# Patient Record
Sex: MALE | Race: WHITE | URBAN - METROPOLITAN AREA
[De-identification: names, ages, dates, MRNs, and addresses within clinical notes are randomized per-mention and may not be internally consistent; named-entity substitution may affect disease eponyms.]

---

## 2017-01-27 ENCOUNTER — TRANSFERRED RECORDS (OUTPATIENT)
Dept: HEALTH INFORMATION MANAGEMENT | Facility: CLINIC | Age: 47
End: 2017-01-27

## 2017-02-01 ENCOUNTER — TRANSFERRED RECORDS (OUTPATIENT)
Dept: HEALTH INFORMATION MANAGEMENT | Facility: CLINIC | Age: 47
End: 2017-02-01

## 2017-02-13 ENCOUNTER — TRANSFERRED RECORDS (OUTPATIENT)
Dept: HEALTH INFORMATION MANAGEMENT | Facility: CLINIC | Age: 47
End: 2017-02-13

## 2017-02-15 ENCOUNTER — PRE VISIT (OUTPATIENT)
Dept: OTOLARYNGOLOGY | Facility: CLINIC | Age: 47
End: 2017-02-15

## 2017-02-15 NOTE — TELEPHONE ENCOUNTER
1.  Date/reason for appt:  2/22/17   Parapharyngeal Mass    2.  Referring provider:  Dr Jeromy Harris ENT    3.  Call to patient (Yes / No - short description):  No, referred    4.  Previous care at / records requested from:  [facility] and [facility]

## 2017-02-15 NOTE — TELEPHONE ENCOUNTER
Records received from [facility], will forward to clinic. Waiting for image.   Included:  ED note on 2/1/17, 1/27/17  CT neck on 2/1/17

## 2017-02-22 ENCOUNTER — OFFICE VISIT (OUTPATIENT)
Dept: OTOLARYNGOLOGY | Facility: CLINIC | Age: 47
End: 2017-02-22

## 2017-02-22 VITALS
HEIGHT: 70 IN | HEART RATE: 74 BPM | DIASTOLIC BLOOD PRESSURE: 90 MMHG | RESPIRATION RATE: 12 BRPM | SYSTOLIC BLOOD PRESSURE: 134 MMHG | BODY MASS INDEX: 26.2 KG/M2 | WEIGHT: 183 LBS | OXYGEN SATURATION: 95 %

## 2017-02-22 DIAGNOSIS — R22.1 PARAPHARYNGEAL SPACE MASS: Primary | ICD-10-CM

## 2017-02-22 RX ORDER — MULTIPLE VITAMINS W/ MINERALS TAB 9MG-400MCG
1 TAB ORAL DAILY
COMMUNITY

## 2017-02-22 ASSESSMENT — PAIN SCALES - GENERAL: PAINLEVEL: NO PAIN (0)

## 2017-02-22 NOTE — PROGRESS NOTES
Dear Dr. Pinzon:    I had the pleasure of meeting Josafat Elder in consultation today at the [facility] at your request.     History of Present Illness:   Mr Elder is a 46 year old man who is referred for evaluation of a parapharyngeal space mass. He has a 4-5 year history of progressive right sided facial swelling. He was seen in the local ER in January 2017 with right ear pain, decreased hearing and was found to have a right sided effusion. At that time he was noted to have right sided soft palate swelling and uvula deviation. He was planning on following up with his PCP. He was treated with a zpak with no improvement in his ear symptoms. He was again seen in the ER at the beginning of February with continued symptoms. A CT scan was obtained which showed a 6.2 cm parapharyngeal space mass. He was referred to Dr Pinzon who then referred him.    He says that he has a long standing history of popping of his right ear with intermittent muffled hearing, which he describes as being similar to the feeling with a change in altitude. He also feels some fullness in his throat but no specific dysphagia or odynophagia. He denies any voice changes or difficulty breathing. Family does endorse some snoring at night but no apnea. He feels his weight is stable. He denies any facial weakness or numbness.    He is otherwise healthy.    No family history of H&N cancers    MEDICATIONS:     Current Outpatient Prescriptions   Medication Sig Dispense Refill     multivitamin, therapeutic with minerals (MULTI-VITAMIN) TABS tablet Take 1 tablet by mouth daily         ALLERGIES:  No Known Allergies    HABITS/SOCIAL HISTORY:   Drinks alcohol 6 pack beer/day  Smoked since 1985, previously 4 ppd, now few cigarettes per day  Works as a , fishes in the winter    PAST MEDICAL HISTORY: No past medical history on file.     FAMILY HISTORY:  No family history on file.    REVIEW OF  "SYSTEMS:  12 point ROS was negative other than the symptoms noted above in the HPI.  Patient Supplied Answers to Review of Systems  No flowsheet data found.      PHYSICAL EXAMINATION:   /90  Pulse 74  Resp 12  Ht 1.778 m (5' 10\")  Wt 83 kg (183 lb)  SpO2 95%  BMI 26.26 kg/m2   Appearance:   normal; NAD, age-appropriate appearance, well-developed, normal habitus   Communication:   normal; communicates verbally, normal voice quality   Head/Face:   inspection -  RIght sided parotid fullness   Salivary glands -  Firm mass of the right cheek that displaces the parotid laterally    Facial strength -  Normal and symmetric bilateral; H/B I/VI   Skin:  normal, no rash   Ocular Motility:  normal occular movements   Ears:  auricle (AD) -  normal  EAC (AD) -  normal  TM (AD) -  Normal, no effusion  auricle (AS) -  normal  EAC (AS) -  normal  TM (AS) -  Normal, no effusion  Normal clinical speech reception   Nose:  Ext. inspection -  Normal  Internal Inspection -  Normal mucosa, septum, and turbinates   Nasopharynx:  normal mucosa  Fullness in right side of nasopharynx that partially obstructs torus   Oral Cavity:  lips -  Normal mucosa, oral competence, and stoma size   Age-appropriate dentition, healthy gingival mucosa   Hard palate, buccal, floor of mouth mucosa normal   Tongue - normal movement, no lesions   Oropharynx:  mucosa -  Normal, no lesions  soft palate -  Fullness and mass effect in right soft palate, firm to palpation; uvula deviated slightly to left from mass effect  Firm mass in right parapharyngeal space, full size cannot be appreciated  tonsils -  Normal, no exudates, no abnormal lesions, symmetric   Hypopharynx:  Normal pyriform sinus and pharyngeal wall mucosa   No pooled secretions   Mass effect along right lateral pharyngeal wall   Larynx:  Epiglottis, false vocal cord, true vocal cord normal in appearance, bilaterally mobile cords    Neck: No visible mass or asymmetry   Normal palpation, no " tenderness, no tracheal deviation  thyroid -  Normal   Normal range of motion   Lymphatic:  no abnormal nodes   Cardiovascular:  warm, pink, well-perfused extremities without swelling, tenderness, or edema   Respiratory:  Normal respiratory effort, no stridor   Neuro/Psych.:  mood/affect -  normal  mental status -  normal  cranial nerves -  normal          PROCEDURES:   Flexible fiberoptic laryngoscopy: Verbal consent was obtained. The nasal cavity was prepped with an aerosolized solution of topical anesthetic and vasoconstrictive agent. The scope was passed through the anterior nasal cavity and advanced. There was mass effect starting at the level of the right nasopharynx and extending along the right lateral pharyngeal wall. The torus on the right side is partially obstructed. Inspection of the larynx revealed bilaterally mobile vocal cords. Pyriform sinuses are symmetric. No intra-arytenoid irregularities noted. The epiglottis, aryepiglottic folds, vallecula and base of tongue are unremarkable. The airway is patent. Procedure tolerated well with no immediate complications noted.      RESULTS REVIEWED:   Outside records - summarized above    CT images were personally reviewed: large ~6 cm mass of the parapharyngeal space, medial to the mandible, extending from the skull base and into the neck, internal carotid is medial to the mass, no flow voids present within the mass    IMPRESSION AND PLAN:   Mr Elder is a 46 year old man with a large right parapharyngeal space mass. He does demonstrate some mass effect on his scope exam and I think he is experiencing some eustachian tube dysfunction from the mass. His snoring may also be from the mass effect of the tumor. We do not yet have a tissue diagnosis and I discussed with him that I would like to try to do this with image guidance. We will review the images at tumor board on Friday and determine the best way to obtain a tissue sample. Given the size of the mass,  there is certainly indication for surgical resection. I explained to the patient that I would ideally like to know if this is a malignancy prior to attempting resection. Pending our discussion at tumor board we may also obtain an MRI to assist with surgical planning. We briefly discussed surgery in that this would be a large resection, and we may need to perform a mandibulotomy for access given the size. I reviewed the CT images with the patient and demonstrated the close proximity of the internal carotid artery. He does start back to work soon, and I explained that he will likely need some time off from work following his stay in the hospital. I will plan on seeing him back in clinic for a full preoperative discussion pending our discussion at tumor board on Friday.    Thank you very much for the opportunity to participate in the care of your patient.      Sarita Wilson M.D.  Otolaryngology- Head & Neck Surgery          CC:  Joseph Pinzon MD, [facility], [facility] Medical Office [address], [address]  [place] [id number]

## 2017-02-22 NOTE — MR AVS SNAPSHOT
After Visit Summary   2017    Josafat Elder    MRN: [id number]           Patient Information     Date Of Birth          1970        Visit Information        Provider Department      2017 10:20 AM Sarita Wilson MD  [facility] and Th[facility]        Today's Diagnoses     Parapharyngeal space mass    -  1      Care Instructions    Your case will be presented at tumor board on Friday.  I will contact you with results and recommendations.  Call me if ?'s arise [phone]  Aleshia Osborn RN        Follow-ups after your visit        Who to contact     Please call your clinic at [phone] to:    Ask questions about your health    Make or cancel appointments    Discuss your medicines    Learn about your test results    Speak to your doctor   If you have compliments or concerns about an experience at your clinic, or if you wish to file a complaint, please contact [facility] Patient Relations at [phone] or email us at [email or web address]         Additional Information About Your Visit        MyChart Information     [employer] is an electronic gateway that provides easy, online access to your medical records. With [employer], you can request a clinic appointment, read your test results, renew a prescription or communicate with your care team.     To sign up for [employer] visit the website at [email or web address]   You will be asked to enter the access code listed below, as well as some personal information. Please follow the directions to create your username and password.     Your access code is: [email or web address]  Expires: 5/15/2017  2:10 PM     Your access code will  in 90 days. If you need help or a new code, please contact your [facility] or call [phone] for assistance.        Care EveryWhere ID     This is your Care EveryWhere ID. This could be used by other organizations to access your [facility]  "records  [id number]        Your Vitals Were     Pulse Respirations Height Pulse Oximetry BMI (Body Mass Index)       74 12 1.778 m (5' 10\") 95% 26.26 kg/m2        Blood Pressure from Last 3 Encounters:   02/22/17 134/90    Weight from Last 3 Encounters:   02/22/17 83 kg (183 lb)              Today, you had the following     No orders found for display       Primary Care Provider    None Specified       No primary provider on file.        Thank you!     Thank you for choosing [facility]  for your care. Our goal is always to provide you with excellent care. Hearing back from our patients is one way we can continue to improve our services. Please take a few minutes to complete the written survey that you may receive in the mail after your visit with us. Thank you!             Your Updated Medication List - Protect others around you: Learn how to safely use, store and throw away your medicines at www.disposemymeds.org.          This list is accurate as of: 2/22/17  6:47 PM.  Always use your most recent med list.                   Brand Name Dispense Instructions for use    Multi-vitamin Tabs tablet      Take 1 tablet by mouth daily         "

## 2017-02-22 NOTE — PATIENT INSTRUCTIONS
Your case will be presented at tumor board on Friday.  I will contact you with results and recommendations.  Call me if ?'s arise [phone]  Aleshia Osborn RN

## 2017-02-22 NOTE — LETTER
2/22/2017       RE: Josafat Elder  [address]  [address]  formerly [facility] [address]     Dear Colleague,    Thank you for referring your patient, Josafat Elder, to the [facility] at [facility]. Please see a copy of my visit note below.    Dear Dr. Pinzon:    I had the pleasure of meeting Josafat Elder in consultation today at the [facility] at your request.     History of Present Illness:   Mr Elder is a 46 year old man who is referred for evaluation of a parapharyngeal space mass. He has a 4-5 year history of progressive right sided facial swelling. He was seen in the local ER in January 2017 with right ear pain, decreased hearing and was found to have a right sided effusion. At that time he was noted to have right sided soft palate swelling and uvula deviation. He was planning on following up with his PCP. He was treated with a zpak with no improvement in his ear symptoms. He was again seen in the ER at the beginning of February with continued symptoms. A CT scan was obtained which showed a 6.2 cm parapharyngeal space mass. He was referred to Dr Pinzon who then referred him.    He says that he has a long standing history of popping of his right ear with intermittent muffled hearing, which he describes as being similar to the feeling with a change in altitude. He also feels some fullness in his throat but no specific dysphagia or odynophagia. He denies any voice changes or difficulty breathing. Family does endorse some snoring at night but no apnea. He feels his weight is stable. He denies any facial weakness or numbness.    He is otherwise healthy.    No family history of H&N cancers    MEDICATIONS:     Current Outpatient Prescriptions   Medication Sig Dispense Refill     multivitamin, therapeutic with minerals (MULTI-VITAMIN) TABS tablet Take 1 tablet by mouth daily         ALLERGIES:  No Known  "Allergies    HABITS/SOCIAL HISTORY:   Drinks alcohol 6 pack beer/day  Smoked since 1985, previously 4 ppd, now few cigarettes per day  Works as a , fishes in the winter    PAST MEDICAL HISTORY: No past medical history on file.     FAMILY HISTORY:  No family history on file.    REVIEW OF SYSTEMS:  12 point ROS was negative other than the symptoms noted above in the HPI.  Patient Supplied Answers to Review of Systems  No flowsheet data found.      PHYSICAL EXAMINATION:   /90  Pulse 74  Resp 12  Ht 1.778 m (5' 10\")  Wt 83 kg (183 lb)  SpO2 95%  BMI 26.26 kg/m2   Appearance:   normal; NAD, age-appropriate appearance, well-developed, normal habitus   Communication:   normal; communicates verbally, normal voice quality   Head/Face:   inspection -  RIght sided parotid fullness   Salivary glands -  Firm mass of the right cheek that displaces the parotid laterally    Facial strength -  Normal and symmetric bilateral; H/B I/VI   Skin:  normal, no rash   Ocular Motility:  normal occular movements   Ears:  auricle (AD) -  normal  EAC (AD) -  normal  TM (AD) -  Normal, no effusion  auricle (AS) -  normal  EAC (AS) -  normal  TM (AS) -  Normal, no effusion  Normal clinical speech reception   Nose:  Ext. inspection -  Normal  Internal Inspection -  Normal mucosa, septum, and turbinates   Nasopharynx:  normal mucosa  Fullness in right side of nasopharynx that partially obstructs torus   Oral Cavity:  lips -  Normal mucosa, oral competence, and stoma size   Age-appropriate dentition, healthy gingival mucosa   Hard palate, buccal, floor of mouth mucosa normal   Tongue - normal movement, no lesions   Oropharynx:  mucosa -  Normal, no lesions  soft palate -  Fullness and mass effect in right soft palate, firm to palpation; uvula deviated slightly to left from mass effect  Firm mass in right parapharyngeal space, full size cannot be appreciated  tonsils -  Normal, no exudates, no abnormal lesions, symmetric "   Hypopharynx:  Normal pyriform sinus and pharyngeal wall mucosa   No pooled secretions   Mass effect along right lateral pharyngeal wall   Larynx:  Epiglottis, false vocal cord, true vocal cord normal in appearance, bilaterally mobile cords    Neck: No visible mass or asymmetry   Normal palpation, no tenderness, no tracheal deviation  thyroid -  Normal   Normal range of motion   Lymphatic:  no abnormal nodes   Cardiovascular:  warm, pink, well-perfused extremities without swelling, tenderness, or edema   Respiratory:  Normal respiratory effort, no stridor   Neuro/Psych.:  mood/affect -  normal  mental status -  normal  cranial nerves -  normal          PROCEDURES:   Flexible fiberoptic laryngoscopy: Verbal consent was obtained. The nasal cavity was prepped with an aerosolized solution of topical anesthetic and vasoconstrictive agent. The scope was passed through the anterior nasal cavity and advanced. There was mass effect starting at the level of the right nasopharynx and extending along the right lateral pharyngeal wall. The torus on the right side is partially obstructed. Inspection of the larynx revealed bilaterally mobile vocal cords. Pyriform sinuses are symmetric. No intra-arytenoid irregularities noted. The epiglottis, aryepiglottic folds, vallecula and base of tongue are unremarkable. The airway is patent. Procedure tolerated well with no immediate complications noted.      RESULTS REVIEWED:   Outside records - summarized above    CT images were personally reviewed: large ~6 cm mass of the parapharyngeal space, medial to the mandible, extending from the skull base and into the neck, internal carotid is medial to the mass, no flow voids present within the mass    IMPRESSION AND PLAN:   Mr Elder is a 46 year old man with a large right parapharyngeal space mass. He does demonstrate some mass effect on his scope exam and I think he is experiencing some eustachian tube dysfunction from the mass. His snoring  may also be from the mass effect of the tumor. We do not yet have a tissue diagnosis and I discussed with him that I would like to try to do this with image guidance. We will review the images at tumor board on Friday and determine the best way to obtain a tissue sample. Given the size of the mass, there is certainly indication for surgical resection. I explained to the patient that I would ideally like to know if this is a malignancy prior to attempting resection. Pending our discussion at tumor board we may also obtain an MRI to assist with surgical planning. We briefly discussed surgery in that this would be a large resection, and we may need to perform a mandibulotomy for access given the size. I reviewed the CT images with the patient and demonstrated the close proximity of the internal carotid artery. He does start back to work soon, and I explained that he will likely need some time off from work following his stay in the hospital. I will plan on seeing him back in clinic for a full preoperative discussion pending our discussion at tumor board on Friday.    Thank you very much for the opportunity to participate in the care of your patient.      Sarita Wilson M.D.  Otolaryngology- Head & Neck Surgery          CC:  Joseph Pinzon MD, [facility], [facility] Office [address], [address]  [place]  [address]

## 2017-02-23 NOTE — TELEPHONE ENCOUNTER
Head & Neck Tumor Conference Note  02/23/2017    Status: New  Staff: Dr. Wilson    Tumor Site: Right parapharyngeal space mass  Tumor Stage: N/A    Brief History:  Mr. Josafat Elder is a 46 year old male presented with right facial swelling, right ear effusion, right throat fullness, found to have a 6.2 cm right parapharyngeal space mass.    Reason for Review:   Review image, discuss plan of care.    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 Recommend MRI for further characterization, also consider image guided biopsy.      Farrukh was called and notified of tumor board recommendations  He will be scheduled for MRI early next week in [place].  His surgery is scheduled for 3/16/17.

## 2017-02-24 ENCOUNTER — TEAM CONFERENCE (OUTPATIENT)
Dept: OTOLARYNGOLOGY | Facility: CLINIC | Age: 47
End: 2017-02-24
Attending: OTOLARYNGOLOGY

## 2017-02-24 DIAGNOSIS — R22.1 PARAPHARYNGEAL SPACE MASS: Primary | ICD-10-CM

## 2017-03-02 ENCOUNTER — HOSPITAL ENCOUNTER (INPATIENT)
Dept: GENERAL RADIOLOGY | Facility: CLINIC | Age: 47
End: 2017-03-02
Attending: OTOLARYNGOLOGY

## 2017-03-02 DIAGNOSIS — R22.1 PARAPHARYNGEAL SPACE MASS: Primary | ICD-10-CM

## 2017-03-03 ENCOUNTER — CARE COORDINATION (OUTPATIENT)
Dept: OTOLARYNGOLOGY | Facility: CLINIC | Age: 47
End: 2017-03-03

## 2017-03-03 NOTE — PROGRESS NOTES
Dr. Wilson reviewed Farrukh's MRI.  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  Farrukh will proceed with surgery

## 2017-03-15 ENCOUNTER — ANESTHESIA EVENT (OUTPATIENT)
Dept: SURGERY | Facility: CLINIC | Age: 47
DRG: 130 | End: 2017-03-15
Payer: COMMERCIAL

## 2017-03-16 ENCOUNTER — ANESTHESIA (OUTPATIENT)
Dept: SURGERY | Facility: CLINIC | Age: 47
DRG: 130 | End: 2017-03-16
Payer: COMMERCIAL

## 2017-03-16 ENCOUNTER — HOSPITAL ENCOUNTER (INPATIENT)
Facility: CLINIC | Age: 47
LOS: 3 days | Discharge: HOME | DRG: 130 | End: 2017-03-19
Attending: OTOLARYNGOLOGY | Admitting: OTOLARYNGOLOGY
Payer: COMMERCIAL

## 2017-03-16 DIAGNOSIS — G51.0 FACIAL NERVE PARESIS: ICD-10-CM

## 2017-03-16 DIAGNOSIS — R22.1 PARAPHARYNGEAL SPACE MASS: ICD-10-CM

## 2017-03-16 DIAGNOSIS — G89.18 ACUTE POST-OPERATIVE PAIN: Primary | ICD-10-CM

## 2017-03-16 DIAGNOSIS — K59.03 DRUG INDUCED CONSTIPATION: ICD-10-CM

## 2017-03-16 LAB
ABO + RH BLD: NORMAL
ABO + RH BLD: NORMAL
BASE EXCESS BLDA CALC-SCNC: 0.1 MMOL/L
BLD GP AB SCN SERPL QL: NORMAL
BLD PROD TYP BPU: NORMAL
BLOOD BANK CMNT PATIENT-IMP: NORMAL
CA-I BLD-MCNC: 4.8 MG/DL (ref 4.4–5.2)
CREAT SERPL-MCNC: 0.94 MG/DL (ref 0.66–1.25)
GFR SERPL CREATININE-BSD FRML MDRD: 86 ML/MIN/1.7M2
GLUCOSE BLD-MCNC: 142 MG/DL (ref 70–99)
HCO3 BLD-SCNC: 25 MMOL/L (ref 21–28)
HGB BLD-MCNC: 14.2 G/DL (ref 13.3–17.7)
HGB BLD-MCNC: 15.1 G/DL (ref 13.3–17.7)
NUM BPU REQUESTED: 2
O2/TOTAL GAS SETTING VFR VENT: 38 %
PCO2 BLD: 42 MM HG (ref 35–45)
PH BLD: 7.39 PH (ref 7.35–7.45)
PLATELET # BLD AUTO: 156 10E9/L (ref 150–450)
PO2 BLD: 150 MM HG (ref 80–105)
POTASSIUM BLD-SCNC: 4.6 MMOL/L (ref 3.4–5.3)
SODIUM BLD-SCNC: 139 MMOL/L (ref 133–144)
SPECIMEN EXP DATE BLD: NORMAL

## 2017-03-16 PROCEDURE — 0CTJ0ZZ RESECTION OF MINOR SALIVARY GLAND, OPEN APPROACH: ICD-10-PCS | Performed by: OTOLARYNGOLOGY

## 2017-03-16 PROCEDURE — 82803 BLOOD GASES ANY COMBINATION: CPT | Performed by: ANESTHESIOLOGY

## 2017-03-16 PROCEDURE — 25000125 ZZHC RX 250: Performed by: ANESTHESIOLOGY

## 2017-03-16 PROCEDURE — 85049 AUTOMATED PLATELET COUNT: CPT | Performed by: OTOLARYNGOLOGY

## 2017-03-16 PROCEDURE — 25000125 ZZHC RX 250: Performed by: NURSE ANESTHETIST, CERTIFIED REGISTERED

## 2017-03-16 PROCEDURE — 82947 ASSAY GLUCOSE BLOOD QUANT: CPT | Performed by: ANESTHESIOLOGY

## 2017-03-16 PROCEDURE — 37000008 ZZH ANESTHESIA TECHNICAL FEE, 1ST 30 MIN: Performed by: OTOLARYNGOLOGY

## 2017-03-16 PROCEDURE — 25000128 H RX IP 250 OP 636: Performed by: ANESTHESIOLOGY

## 2017-03-16 PROCEDURE — 25800025 ZZH RX 258: Performed by: OTOLARYNGOLOGY

## 2017-03-16 PROCEDURE — 07T10ZZ RESECTION OF RIGHT NECK LYMPHATIC, OPEN APPROACH: ICD-10-PCS | Performed by: OTOLARYNGOLOGY

## 2017-03-16 PROCEDURE — 82565 ASSAY OF CREATININE: CPT | Performed by: OTOLARYNGOLOGY

## 2017-03-16 PROCEDURE — 82330 ASSAY OF CALCIUM: CPT | Performed by: ANESTHESIOLOGY

## 2017-03-16 PROCEDURE — 84132 ASSAY OF SERUM POTASSIUM: CPT | Performed by: ANESTHESIOLOGY

## 2017-03-16 PROCEDURE — 40000275 ZZH STATISTIC RCP TIME EA 10 MIN

## 2017-03-16 PROCEDURE — 88305 TISSUE EXAM BY PATHOLOGIST: CPT | Performed by: OTOLARYNGOLOGY

## 2017-03-16 PROCEDURE — 86901 BLOOD TYPING SEROLOGIC RH(D): CPT | Performed by: OTOLARYNGOLOGY

## 2017-03-16 PROCEDURE — 25000128 H RX IP 250 OP 636: Performed by: OTOLARYNGOLOGY

## 2017-03-16 PROCEDURE — 25000128 H RX IP 250 OP 636

## 2017-03-16 PROCEDURE — 86900 BLOOD TYPING SEROLOGIC ABO: CPT | Performed by: OTOLARYNGOLOGY

## 2017-03-16 PROCEDURE — 40000014 ZZH STATISTIC ARTERIAL MONITORING DAILY

## 2017-03-16 PROCEDURE — 12000008 ZZH R&B INTERMEDIATE UMMC

## 2017-03-16 PROCEDURE — 25000128 H RX IP 250 OP 636: Performed by: NURSE ANESTHETIST, CERTIFIED REGISTERED

## 2017-03-16 PROCEDURE — 36000064 ZZH SURGERY LEVEL 4 EA 15 ADDTL MIN - UMMC: Performed by: OTOLARYNGOLOGY

## 2017-03-16 PROCEDURE — 86850 RBC ANTIBODY SCREEN: CPT | Performed by: OTOLARYNGOLOGY

## 2017-03-16 PROCEDURE — 0CJS8ZZ INSPECTION OF LARYNX, VIA NATURAL OR ARTIFICIAL OPENING ENDOSCOPIC: ICD-10-PCS | Performed by: OTOLARYNGOLOGY

## 2017-03-16 PROCEDURE — 71000014 ZZH RECOVERY PHASE 1 LEVEL 2 FIRST HR: Performed by: OTOLARYNGOLOGY

## 2017-03-16 PROCEDURE — 36415 COLL VENOUS BLD VENIPUNCTURE: CPT | Performed by: OTOLARYNGOLOGY

## 2017-03-16 PROCEDURE — 0CBM0ZZ EXCISION OF PHARYNX, OPEN APPROACH: ICD-10-PCS | Performed by: OTOLARYNGOLOGY

## 2017-03-16 PROCEDURE — 25000565 ZZH ISOFLURANE, EA 15 MIN: Performed by: OTOLARYNGOLOGY

## 2017-03-16 PROCEDURE — 27210995 ZZH RX 272: Performed by: OTOLARYNGOLOGY

## 2017-03-16 PROCEDURE — 37000009 ZZH ANESTHESIA TECHNICAL FEE, EACH ADDTL 15 MIN: Performed by: OTOLARYNGOLOGY

## 2017-03-16 PROCEDURE — 86923 COMPATIBILITY TEST ELECTRIC: CPT | Performed by: OTOLARYNGOLOGY

## 2017-03-16 PROCEDURE — 88307 TISSUE EXAM BY PATHOLOGIST: CPT | Performed by: OTOLARYNGOLOGY

## 2017-03-16 PROCEDURE — 25000132 ZZH RX MED GY IP 250 OP 250 PS 637: Performed by: OTOLARYNGOLOGY

## 2017-03-16 PROCEDURE — 84295 ASSAY OF SERUM SODIUM: CPT | Performed by: ANESTHESIOLOGY

## 2017-03-16 PROCEDURE — 85018 HEMOGLOBIN: CPT | Performed by: ANESTHESIOLOGY

## 2017-03-16 PROCEDURE — 25800025 ZZH RX 258: Performed by: NURSE ANESTHETIST, CERTIFIED REGISTERED

## 2017-03-16 PROCEDURE — 36000062 ZZH SURGERY LEVEL 4 1ST 30 MIN - UMMC: Performed by: OTOLARYNGOLOGY

## 2017-03-16 PROCEDURE — 40000170 ZZH STATISTIC PRE-PROCEDURE ASSESSMENT II: Performed by: OTOLARYNGOLOGY

## 2017-03-16 PROCEDURE — 00BM0ZZ EXCISION OF FACIAL NERVE, OPEN APPROACH: ICD-10-PCS | Performed by: OTOLARYNGOLOGY

## 2017-03-16 PROCEDURE — 0CT80ZZ RESECTION OF RIGHT PAROTID GLAND, OPEN APPROACH: ICD-10-PCS | Performed by: OTOLARYNGOLOGY

## 2017-03-16 PROCEDURE — 27210794 ZZH OR GENERAL SUPPLY STERILE: Performed by: OTOLARYNGOLOGY

## 2017-03-16 RX ORDER — MULTIPLE VITAMINS W/ MINERALS TAB 9MG-400MCG
1 TAB ORAL DAILY
Status: DISCONTINUED | OUTPATIENT
Start: 2017-03-17 | End: 2017-03-19 | Stop reason: HOSPADM

## 2017-03-16 RX ORDER — LANOLIN ALCOHOL/MO/W.PET/CERES
100 CREAM (GRAM) TOPICAL DAILY
Status: COMPLETED | OUTPATIENT
Start: 2017-03-17 | End: 2017-03-19

## 2017-03-16 RX ORDER — DIPHENHYDRAMINE HCL 25 MG
25 CAPSULE ORAL EVERY 6 HOURS PRN
Status: DISCONTINUED | OUTPATIENT
Start: 2017-03-16 | End: 2017-03-19 | Stop reason: HOSPADM

## 2017-03-16 RX ORDER — PROCHLORPERAZINE MALEATE 5 MG
5-10 TABLET ORAL EVERY 6 HOURS PRN
Status: DISCONTINUED | OUTPATIENT
Start: 2017-03-16 | End: 2017-03-19 | Stop reason: HOSPADM

## 2017-03-16 RX ORDER — FOLIC ACID 1 MG/1
1 TABLET ORAL DAILY
Status: DISCONTINUED | OUTPATIENT
Start: 2017-03-17 | End: 2017-03-19 | Stop reason: HOSPADM

## 2017-03-16 RX ORDER — AMOXICILLIN 250 MG
1-2 CAPSULE ORAL 2 TIMES DAILY
Status: DISCONTINUED | OUTPATIENT
Start: 2017-03-16 | End: 2017-03-19 | Stop reason: HOSPADM

## 2017-03-16 RX ORDER — FENTANYL CITRATE 50 UG/ML
INJECTION, SOLUTION INTRAMUSCULAR; INTRAVENOUS PRN
Status: DISCONTINUED | OUTPATIENT
Start: 2017-03-16 | End: 2017-03-16

## 2017-03-16 RX ORDER — LIDOCAINE 40 MG/G
CREAM TOPICAL
Status: DISCONTINUED | OUTPATIENT
Start: 2017-03-16 | End: 2017-03-19 | Stop reason: HOSPADM

## 2017-03-16 RX ORDER — SODIUM CHLORIDE, SODIUM LACTATE, POTASSIUM CHLORIDE, CALCIUM CHLORIDE 600; 310; 30; 20 MG/100ML; MG/100ML; MG/100ML; MG/100ML
INJECTION, SOLUTION INTRAVENOUS CONTINUOUS
Status: DISCONTINUED | OUTPATIENT
Start: 2017-03-16 | End: 2017-03-16 | Stop reason: HOSPADM

## 2017-03-16 RX ORDER — OXYCODONE HYDROCHLORIDE 5 MG/1
5-10 TABLET ORAL
Status: DISCONTINUED | OUTPATIENT
Start: 2017-03-16 | End: 2017-03-19 | Stop reason: HOSPADM

## 2017-03-16 RX ORDER — ACETAMINOPHEN 325 MG/1
975 TABLET ORAL EVERY 8 HOURS
Status: DISCONTINUED | OUTPATIENT
Start: 2017-03-16 | End: 2017-03-19 | Stop reason: HOSPADM

## 2017-03-16 RX ORDER — NALOXONE HYDROCHLORIDE 0.4 MG/ML
.1-.4 INJECTION, SOLUTION INTRAMUSCULAR; INTRAVENOUS; SUBCUTANEOUS
Status: DISCONTINUED | OUTPATIENT
Start: 2017-03-16 | End: 2017-03-16 | Stop reason: HOSPADM

## 2017-03-16 RX ORDER — ONDANSETRON 4 MG/1
4 TABLET, ORALLY DISINTEGRATING ORAL EVERY 30 MIN PRN
Status: DISCONTINUED | OUTPATIENT
Start: 2017-03-16 | End: 2017-03-16 | Stop reason: HOSPADM

## 2017-03-16 RX ORDER — MINERAL OIL/HYDROPHIL PETROLAT
OINTMENT (GRAM) TOPICAL EVERY 8 HOURS
Status: DISCONTINUED | OUTPATIENT
Start: 2017-03-17 | End: 2017-03-19 | Stop reason: HOSPADM

## 2017-03-16 RX ORDER — DIPHENHYDRAMINE HYDROCHLORIDE 50 MG/ML
25 INJECTION INTRAMUSCULAR; INTRAVENOUS EVERY 6 HOURS PRN
Status: DISCONTINUED | OUTPATIENT
Start: 2017-03-16 | End: 2017-03-19 | Stop reason: HOSPADM

## 2017-03-16 RX ORDER — ONDANSETRON 2 MG/ML
4 INJECTION INTRAMUSCULAR; INTRAVENOUS EVERY 30 MIN PRN
Status: DISCONTINUED | OUTPATIENT
Start: 2017-03-16 | End: 2017-03-16 | Stop reason: HOSPADM

## 2017-03-16 RX ORDER — ONDANSETRON 4 MG/1
4 TABLET, ORALLY DISINTEGRATING ORAL EVERY 6 HOURS PRN
Status: DISCONTINUED | OUTPATIENT
Start: 2017-03-16 | End: 2017-03-19 | Stop reason: HOSPADM

## 2017-03-16 RX ORDER — PROPOFOL 10 MG/ML
INJECTION, EMULSION INTRAVENOUS PRN
Status: DISCONTINUED | OUTPATIENT
Start: 2017-03-16 | End: 2017-03-16

## 2017-03-16 RX ORDER — ONDANSETRON 2 MG/ML
INJECTION INTRAMUSCULAR; INTRAVENOUS PRN
Status: DISCONTINUED | OUTPATIENT
Start: 2017-03-16 | End: 2017-03-16

## 2017-03-16 RX ORDER — GINSENG 100 MG
CAPSULE ORAL EVERY 8 HOURS
Status: ACTIVE | OUTPATIENT
Start: 2017-03-16 | End: 2017-03-18

## 2017-03-16 RX ORDER — METOCLOPRAMIDE 10 MG/1
10 TABLET ORAL EVERY 6 HOURS PRN
Status: DISCONTINUED | OUTPATIENT
Start: 2017-03-16 | End: 2017-03-19 | Stop reason: HOSPADM

## 2017-03-16 RX ORDER — FENTANYL CITRATE 50 UG/ML
25-50 INJECTION, SOLUTION INTRAMUSCULAR; INTRAVENOUS
Status: DISCONTINUED | OUTPATIENT
Start: 2017-03-16 | End: 2017-03-16 | Stop reason: HOSPADM

## 2017-03-16 RX ORDER — LIDOCAINE HYDROCHLORIDE 20 MG/ML
INJECTION, SOLUTION INFILTRATION; PERINEURAL PRN
Status: DISCONTINUED | OUTPATIENT
Start: 2017-03-16 | End: 2017-03-16

## 2017-03-16 RX ORDER — HYDROMORPHONE HYDROCHLORIDE 1 MG/ML
.3-.5 INJECTION, SOLUTION INTRAMUSCULAR; INTRAVENOUS; SUBCUTANEOUS EVERY 5 MIN PRN
Status: DISCONTINUED | OUTPATIENT
Start: 2017-03-16 | End: 2017-03-16 | Stop reason: HOSPADM

## 2017-03-16 RX ORDER — LORAZEPAM 1 MG/1
1-4 TABLET ORAL EVERY 30 MIN PRN
Status: DISCONTINUED | OUTPATIENT
Start: 2017-03-16 | End: 2017-03-19 | Stop reason: HOSPADM

## 2017-03-16 RX ORDER — DEXAMETHASONE SODIUM PHOSPHATE 4 MG/ML
INJECTION, SOLUTION INTRA-ARTICULAR; INTRALESIONAL; INTRAMUSCULAR; INTRAVENOUS; SOFT TISSUE PRN
Status: DISCONTINUED | OUTPATIENT
Start: 2017-03-16 | End: 2017-03-16

## 2017-03-16 RX ORDER — SODIUM CHLORIDE, SODIUM LACTATE, POTASSIUM CHLORIDE, CALCIUM CHLORIDE 600; 310; 30; 20 MG/100ML; MG/100ML; MG/100ML; MG/100ML
INJECTION, SOLUTION INTRAVENOUS CONTINUOUS PRN
Status: DISCONTINUED | OUTPATIENT
Start: 2017-03-16 | End: 2017-03-16

## 2017-03-16 RX ORDER — ATENOLOL 50 MG/1
50 TABLET ORAL DAILY PRN
Status: DISCONTINUED | OUTPATIENT
Start: 2017-03-16 | End: 2017-03-19 | Stop reason: HOSPADM

## 2017-03-16 RX ORDER — LIDOCAINE 40 MG/G
CREAM TOPICAL
Status: DISCONTINUED | OUTPATIENT
Start: 2017-03-16 | End: 2017-03-16 | Stop reason: HOSPADM

## 2017-03-16 RX ORDER — NALOXONE HYDROCHLORIDE 0.4 MG/ML
.1-.4 INJECTION, SOLUTION INTRAMUSCULAR; INTRAVENOUS; SUBCUTANEOUS
Status: DISCONTINUED | OUTPATIENT
Start: 2017-03-16 | End: 2017-03-19 | Stop reason: HOSPADM

## 2017-03-16 RX ORDER — GLYCOPYRROLATE 0.2 MG/ML
INJECTION, SOLUTION INTRAMUSCULAR; INTRAVENOUS PRN
Status: DISCONTINUED | OUTPATIENT
Start: 2017-03-16 | End: 2017-03-16

## 2017-03-16 RX ORDER — MORPHINE SULFATE 2 MG/ML
2-4 INJECTION, SOLUTION INTRAMUSCULAR; INTRAVENOUS
Status: DISCONTINUED | OUTPATIENT
Start: 2017-03-16 | End: 2017-03-19 | Stop reason: HOSPADM

## 2017-03-16 RX ORDER — ACETAMINOPHEN 325 MG/1
650 TABLET ORAL EVERY 4 HOURS PRN
Status: DISCONTINUED | OUTPATIENT
Start: 2017-03-19 | End: 2017-03-19 | Stop reason: HOSPADM

## 2017-03-16 RX ORDER — ONDANSETRON 2 MG/ML
4 INJECTION INTRAMUSCULAR; INTRAVENOUS EVERY 6 HOURS PRN
Status: DISCONTINUED | OUTPATIENT
Start: 2017-03-16 | End: 2017-03-19 | Stop reason: HOSPADM

## 2017-03-16 RX ORDER — METOCLOPRAMIDE HYDROCHLORIDE 5 MG/ML
10 INJECTION INTRAMUSCULAR; INTRAVENOUS EVERY 6 HOURS PRN
Status: DISCONTINUED | OUTPATIENT
Start: 2017-03-16 | End: 2017-03-19 | Stop reason: HOSPADM

## 2017-03-16 RX ORDER — DEXTROSE MONOHYDRATE, SODIUM CHLORIDE, AND POTASSIUM CHLORIDE 50; 1.49; 4.5 G/1000ML; G/1000ML; G/1000ML
INJECTION, SOLUTION INTRAVENOUS CONTINUOUS
Status: DISCONTINUED | OUTPATIENT
Start: 2017-03-16 | End: 2017-03-17

## 2017-03-16 RX ORDER — CEFAZOLIN SODIUM 2 G/100ML
2 INJECTION, SOLUTION INTRAVENOUS
Status: COMPLETED | OUTPATIENT
Start: 2017-03-16 | End: 2017-03-16

## 2017-03-16 RX ORDER — CEFAZOLIN SODIUM 2 G/100ML
2 INJECTION, SOLUTION INTRAVENOUS EVERY 8 HOURS
Status: COMPLETED | OUTPATIENT
Start: 2017-03-17 | End: 2017-03-17

## 2017-03-16 RX ADMIN — MIDAZOLAM HYDROCHLORIDE 2 MG: 1 INJECTION, SOLUTION INTRAMUSCULAR; INTRAVENOUS at 07:27

## 2017-03-16 RX ADMIN — REMIFENTANIL HYDROCHLORIDE 0.05 MCG/KG/MIN: 1 INJECTION, POWDER, LYOPHILIZED, FOR SOLUTION INTRAVENOUS at 08:05

## 2017-03-16 RX ADMIN — HYDROMORPHONE HYDROCHLORIDE 0.4 MG: 1 INJECTION, SOLUTION INTRAMUSCULAR; INTRAVENOUS; SUBCUTANEOUS at 18:38

## 2017-03-16 RX ADMIN — FENTANYL CITRATE 25 MCG: 50 INJECTION, SOLUTION INTRAMUSCULAR; INTRAVENOUS at 20:19

## 2017-03-16 RX ADMIN — SODIUM CHLORIDE, POTASSIUM CHLORIDE, SODIUM LACTATE AND CALCIUM CHLORIDE: 600; 310; 30; 20 INJECTION, SOLUTION INTRAVENOUS at 08:24

## 2017-03-16 RX ADMIN — HYDROMORPHONE HYDROCHLORIDE 0.3 MG: 10 INJECTION, SOLUTION INTRAMUSCULAR; INTRAVENOUS; SUBCUTANEOUS at 20:26

## 2017-03-16 RX ADMIN — CEFAZOLIN SODIUM 1 G: 2 INJECTION, SOLUTION INTRAVENOUS at 12:15

## 2017-03-16 RX ADMIN — SODIUM CHLORIDE, POTASSIUM CHLORIDE, SODIUM LACTATE AND CALCIUM CHLORIDE: 600; 310; 30; 20 INJECTION, SOLUTION INTRAVENOUS at 07:27

## 2017-03-16 RX ADMIN — REMIFENTANIL HYDROCHLORIDE 0.1 MCG/KG/MIN: 1 INJECTION, POWDER, LYOPHILIZED, FOR SOLUTION INTRAVENOUS at 15:01

## 2017-03-16 RX ADMIN — FENTANYL CITRATE 25 MCG: 50 INJECTION, SOLUTION INTRAMUSCULAR; INTRAVENOUS at 20:27

## 2017-03-16 RX ADMIN — SODIUM CHLORIDE, POTASSIUM CHLORIDE, SODIUM LACTATE AND CALCIUM CHLORIDE: 600; 310; 30; 20 INJECTION, SOLUTION INTRAVENOUS at 13:00

## 2017-03-16 RX ADMIN — PROPOFOL 160 MG: 10 INJECTION, EMULSION INTRAVENOUS at 07:37

## 2017-03-16 RX ADMIN — LIDOCAINE HYDROCHLORIDE 100 MG: 20 INJECTION, SOLUTION INFILTRATION; PERINEURAL at 07:37

## 2017-03-16 RX ADMIN — SODIUM CHLORIDE, POTASSIUM CHLORIDE, SODIUM LACTATE AND CALCIUM CHLORIDE: 600; 310; 30; 20 INJECTION, SOLUTION INTRAVENOUS at 10:16

## 2017-03-16 RX ADMIN — POTASSIUM CHLORIDE, DEXTROSE MONOHYDRATE AND SODIUM CHLORIDE: 150; 5; 450 INJECTION, SOLUTION INTRAVENOUS at 20:05

## 2017-03-16 RX ADMIN — CEFAZOLIN SODIUM 1 G: 2 INJECTION, SOLUTION INTRAVENOUS at 18:15

## 2017-03-16 RX ADMIN — FENTANYL CITRATE 100 MCG: 50 INJECTION, SOLUTION INTRAMUSCULAR; INTRAVENOUS at 08:01

## 2017-03-16 RX ADMIN — SUCCINYLCHOLINE CHLORIDE 140 MG: 20 INJECTION, SOLUTION INTRAMUSCULAR; INTRAVENOUS at 07:38

## 2017-03-16 RX ADMIN — FENTANYL CITRATE 50 MCG: 50 INJECTION, SOLUTION INTRAMUSCULAR; INTRAVENOUS at 07:47

## 2017-03-16 RX ADMIN — CEFAZOLIN SODIUM 1 G: 2 INJECTION, SOLUTION INTRAVENOUS at 16:15

## 2017-03-16 RX ADMIN — FENTANYL CITRATE 25 MCG: 50 INJECTION, SOLUTION INTRAMUSCULAR; INTRAVENOUS at 20:04

## 2017-03-16 RX ADMIN — CEFAZOLIN SODIUM 1 G: 2 INJECTION, SOLUTION INTRAVENOUS at 10:15

## 2017-03-16 RX ADMIN — CEFAZOLIN SODIUM 2 G: 2 INJECTION, SOLUTION INTRAVENOUS at 08:15

## 2017-03-16 RX ADMIN — DEXAMETHASONE SODIUM PHOSPHATE 8 MG: 4 INJECTION, SOLUTION INTRAMUSCULAR; INTRAVENOUS at 08:10

## 2017-03-16 RX ADMIN — GLYCOPYRROLATE 0.2 MG: 0.2 INJECTION, SOLUTION INTRAMUSCULAR; INTRAVENOUS at 07:34

## 2017-03-16 RX ADMIN — PROPOFOL 50 MG: 10 INJECTION, EMULSION INTRAVENOUS at 19:09

## 2017-03-16 RX ADMIN — PROPOFOL 40 MG: 10 INJECTION, EMULSION INTRAVENOUS at 07:47

## 2017-03-16 RX ADMIN — OXYCODONE HYDROCHLORIDE 10 MG: 5 TABLET ORAL at 22:20

## 2017-03-16 RX ADMIN — DEXAMETHASONE SODIUM PHOSPHATE 10 MG: 4 INJECTION, SOLUTION INTRAMUSCULAR; INTRAVENOUS at 16:10

## 2017-03-16 RX ADMIN — ONDANSETRON 4 MG: 2 INJECTION INTRAMUSCULAR; INTRAVENOUS at 18:40

## 2017-03-16 RX ADMIN — SODIUM CHLORIDE, POTASSIUM CHLORIDE, SODIUM LACTATE AND CALCIUM CHLORIDE: 600; 310; 30; 20 INJECTION, SOLUTION INTRAVENOUS at 14:28

## 2017-03-16 RX ADMIN — CEFAZOLIN SODIUM 1 G: 2 INJECTION, SOLUTION INTRAVENOUS at 14:15

## 2017-03-16 RX ADMIN — FENTANYL CITRATE 100 MCG: 50 INJECTION, SOLUTION INTRAMUSCULAR; INTRAVENOUS at 07:37

## 2017-03-16 RX ADMIN — FENTANYL CITRATE 25 MCG: 50 INJECTION, SOLUTION INTRAMUSCULAR; INTRAVENOUS at 20:14

## 2017-03-16 RX ADMIN — HYDROMORPHONE HYDROCHLORIDE 0.4 MG: 1 INJECTION, SOLUTION INTRAMUSCULAR; INTRAVENOUS; SUBCUTANEOUS at 19:09

## 2017-03-16 ASSESSMENT — VISUAL ACUITY: OU: GLASSES

## 2017-03-16 ASSESSMENT — LIFESTYLE VARIABLES: TOBACCO_USE: 1

## 2017-03-16 NOTE — IP AVS SNAPSHOT
MRN:[id number]                      After Visit Summary   3/16/2017    Josafat Elder    MRN: [id number]           Thank you!     Thank you for choosing [facility] for your care. Our goal is always to provide you with excellent care. Hearing back from our patients is one way we can continue to improve our services. Please take a few minutes to complete the written survey that you may receive in the mail after you visit with us. Thank you!        Patient Information     Date Of Birth          1970        About your hospital stay     You were admitted on:  March 16, 2017 You last received care in the:  [address] [facility]    You were discharged on:  March 19, 2017        Reason for your hospital stay       Neck surgery                  Who to Call     For medical emergencies, please call 911.  For non-urgent questions about your medical care, please call your primary care provider or clinic, [phone]  For questions related to your surgery, please call your surgery clinic        Attending Provider     Provider Sarita Thurman MD Otolaryngology       Primary Care Provider Office Phone # Fax #    John Ashraf [phone]       [facility] [address] [facility] [address] [address]        After Care Instructions     Activity       Your activity upon discharge: activity as tolerated            Diet       Follow this diet upon discharge: Regular            Wound care and dressings       Instructions to care for your wound at home: apply Aquaphor to wound three times a day.                  Follow-up Appointments     Adult [facility] Follow-up and recommended labs and tests       Follow up with Dr. Wilson , at ENT clinic on 3/24/17  Appointments on [place] and/or [facility] (with [facility] or [facility] provider or service). Call [phone] if you haven't heard regarding these appointments within 7 days of discharge.                  Your next 10 appointments  "already scheduled     Mar 24, 2017  2:20 PM CDT   (Arrive by 2:05 PM)   RETURN TUMOR VISIT with Sarita Wilson MD   [facility] ([facility])    [address] [place] [id number] [phone]              Pending Results     Date and Time Order Name Status Description    3/16/2017 1236 Surgical pathology exam In process             Statement of Approval     Ordered          17 1430  I have reviewed and agree with all the recommendations and orders detailed in this document.  EFFECTIVE NOW     Approved and electronically signed by:  Germán Olguin             Admission Information     Date & Time Provider Department Dept. Phone    3/16/2017 Sarita Wilson MD [address] [facility] [phone]      Your Vitals Were     Blood Pressure Pulse Temperature Respirations Height Weight    155/106 (BP Location: Right arm) 93 98.4  F (36.9  C) (Oral) 16 1.778 m (5' 10\") 83 kg (182 lb 15.7 oz)    Pulse Oximetry BMI (Body Mass Index)                99% 26.26 kg/m2          [employer] Information     [employer] lets you send messages to your doctor, view your test results, renew your prescriptions, schedule appointments and more. To sign up, go to [email or web address] . Click on \"Log in\" on the left side of the screen, which will take you to the Welcome page. Then click on \"Sign up Now\" on the right side of the page.     You will be asked to enter the access code listed below, as well as some personal information. Please follow the directions to create your username and password.     Your access code is: [email or web address]  Expires: 5/15/2017  3:10 PM     Your access code will  in 90 days. If you need help or a new code, please call your [facility] or [phone].        Care EveryWhere ID     This is your Care EveryWhere ID. This could be used by other organizations to access your [facility] medical records  [id number]           Review of your " medicines      START taking        Dose / Directions    carboxymethylcellulose 1 % ophthalmic solution   Commonly known as:  CELLUVISC/REFRESH LIQUIGEL   Used for:  Facial nerve paresis        Dose:  2 drop   Place 2 drops into the right eye every 2 hours as needed for dry eyes   Quantity:  1 Bottle   Refills:  0       mineral oil-hydrophilic petrolatum   Used for:  Parapharyngeal space mass        Dose:  1 g   Apply 1 g topically every 8 hours To neck incision   Quantity:  50 g   Refills:  0       oxyCODONE 5 MG IR tablet   Commonly known as:  ROXICODONE   Used for:  Acute post-operative pain        Dose:  5-10 mg   Take 1-2 tablets (5-10 mg) by mouth every 3 hours as needed for moderate to severe pain   Quantity:  40 tablet   Refills:  0       senna-docusate 8.6-50 MG per tablet   Commonly known as:  SENOKOT-S;PERICOLACE   Used for:  Drug induced constipation        Dose:  1-2 tablet   Take 1-2 tablets by mouth 2 times daily   Quantity:  20 tablet   Refills:  0         CONTINUE these medicines which have NOT CHANGED        Dose / Directions    Multi-vitamin Tabs tablet        Dose:  1 tablet   Take 1 tablet by mouth daily   Refills:  0            Where to get your medicines      These medications were sent to [facility] [place], [place] - [address] [address]     Phone:  [phone]     carboxymethylcellulose 1 % ophthalmic solution    mineral oil-hydrophilic petrolatum    senna-docusate 8.6-50 MG per tablet         Some of these will need a paper prescription and others can be bought over the counter. Ask your nurse if you have questions.     Bring a paper prescription for each of these medications     oxyCODONE 5 MG IR tablet                Protect others around you: Learn how to safely use, store and throw away your medicines at www.disposemymeds.org.             Medication List: This is a list of all your medications and when to take them. Check  marks below indicate your daily home schedule. Keep this list as a reference.      Medications           Morning Afternoon Evening Bedtime As Needed    carboxymethylcellulose 1 % ophthalmic solution   Commonly known as:  CELLUVISC/REFRESH LIQUIGEL   Place 2 drops into the right eye every 2 hours as needed for dry eyes   Last time this was given:  2 drops on 3/17/2017 10:45 AM                                mineral oil-hydrophilic petrolatum   Apply 1 g topically every 8 hours To neck incision   Last time this was given:  3/19/2017  1:58 PM                                Multi-vitamin Tabs tablet   Take 1 tablet by mouth daily   Last time this was given:  1 tablet on 3/19/2017  8:00 AM                                oxyCODONE 5 MG IR tablet   Commonly known as:  ROXICODONE   Take 1-2 tablets (5-10 mg) by mouth every 3 hours as needed for moderate to severe pain   Last time this was given:  10 mg on 3/19/2017  1:58 PM                                senna-docusate 8.6-50 MG per tablet   Commonly known as:  SENOKOT-S;PERICOLACE   Take 1-2 tablets by mouth 2 times daily   Last time this was given:  2 tablets on 3/19/2017  8:00 AM

## 2017-03-16 NOTE — ANESTHESIA PREPROCEDURE EVALUATION
Anesthesia Evaluation     . Pt has had prior anesthetic. Type: MAC    No history of anesthetic complications     ROS/MED HX    ENT/Pulmonary:     (+)[facility] risk factors snores loudly, tobacco use, Past use , . .    Neurologic:       Cardiovascular:     (+) ----. : . . . :. . No previous cardiac testing       METS/Exercise Tolerance:     Hematologic:         Musculoskeletal:         GI/Hepatic:         Renal/Genitourinary:         Endo:         Psychiatric:         Infectious Disease:         Malignancy:         Other:               Physical Exam  Normal systems: cardiovascular, pulmonary and dental    Airway   Mallampati: I  TM distance: >3 FB  Neck ROM: full  Comment: Mass from right parapharyngeal space pushing to midline.  Patent on left    Dental     Cardiovascular       Pulmonary                     Anesthesia Plan      History & Physical Review  History and physical reviewed and following examination; no interval change.    ASA Status:  3 .    NPO Status:  > 8 hours    Plan for General and ETT with Intravenous induction. Maintenance will be Balanced.    PONV prophylaxis:  Ondansetron (or other 5HT-3) and Dexamethasone or Solumedrol  Additional equipment: Videolaryngoscope, Arterial Line, 2nd IV and Fiberoptic bronchoscope      Postoperative Care  Postoperative pain management:  IV analgesics.  Plan for postoperative opioid use.    Consents  Anesthetic plan, risks, benefits and alternatives discussed with:  Patient and Spouse.  Use of blood products discussed: Yes.   Use of blood products discussed with Patient and Spouse.  Consented to blood products.  .          ANESTHESIA PREOP EVALUATION    NPO Status: NPO per Anesthesia Guidelines    Procedure: remove parapharyngeal mass    HPI: right parapharngeal mass     PMHx/PSHx/ROS:  PAST MEDICAL HISTORY:   Past Medical History   Diagnosis Date     Pleomorphic adenoma        PAST SURGICAL HISTORY: History reviewed. No pertinent past surgical history.    FAMILY  HISTORY: History reviewed. No pertinent family history.      Past Anes Hx: No personal or family h/o anesthesia problems    Soc Hx:   Tobacco: past smoker  EtOH: 6 pack per day    Allergies: No Known Allergies    Meds:   Prescriptions Prior to Admission   Medication Sig Dispense Refill Last Dose     multivitamin, therapeutic with minerals (MULTI-VITAMIN) TABS tablet Take 1 tablet by mouth daily   3/14/2017       No current outpatient prescriptions on file.       Physical Exam:  VS: T 98.4, P Data Unavailable, /100, R 16, SpO2 96%     Airway: MP 1, TM>3FB, Neck full ROM.  Mass from right parapharyngeal space  Dentition: no loose teeth  Heart: RRR  Lungs: CTAB      BMP:  No results found for: NA   No results found for: POTASSIUM  No results found for: CHLORIDE  No results found for: DANI  No results found for: CO2  No results found for: BUN  No results found for: CR  No results found for: GLC     CBC:  No results found for: WBC  No results found for: HGB  No results found for: HCT  No results found for: PLT     Coags/Type and Screen  No results found for: INR  No results found for: PT  Type and Screen:      Assessment/Plan:  - ASA 3  - GETA with standard ASA monitors, IV induction, balanced anesthetic  - PIV x 2  - Antibiotics per surgery  - PONV prophylaxis  - Blood products available, possible administration discussed with patient    Sony Lawrence M.D.    3/16/2017  7:11 AM                      .

## 2017-03-16 NOTE — OP NOTE
Date of Procedure: 3/16/2017    Attending Physician: Sarita Wilson MD    Resident Physicians: Kim Baron MD    Procedure Performed:  Right total parotidectomy with facial nerve dissection  Right neck dissection (levels 1B, 2, 3)  Removal of parapharyngeal space mass  Direct laryngoscopy    Preoperative Diagnosis: Prestyloid parapharyngeal space mass    Postoperative Diagnosis: same    Anesthesia: General    Blood loss: 250 cc    Specimens:   ID Type Source Tests Collected by Time Destination   A : Right superficial parotidectomy Tissue Neck SURGICAL PATHOLOGY EXAM Sarita Wilson MD 3/16/2017 12:35 PM     B : Right Level 2B Neck Dissection Tissue Neck SURGICAL PATHOLOGY EXAM Sarita Wilson MD 3/16/2017 12:58 PM     C : Right Level 2A, 3 Neck Dissection Tissue Neck SURGICAL PATHOLOGY EXAM Sarita Wilson MD 3/16/2017 1:25 PM     D : Right Submandibular Gland Tissue Neck SURGICAL PATHOLOGY EXAM Sarita Wilson MD 3/16/2017 1:41 PM     E : Right Additional Parotid over Facial Nerve Tissue Neck SURGICAL PATHOLOGY EXAM Sarita Wilson MD 3/16/2017 4:34 PM     F: Right deep lobe parotid, right parapharyngeal space mass Tissue Parotid gland SURGICAL PATHOLOGY EXAM Sarita Wilson MD 3/16/2017  7:30 PM         Implants:  RUTH drain x2    Complications: None    Findings:  ~7 cm parapharyngeal space mass originating from the deep lobe of the parotid, extending up to the skull base, encasing the retromandibular vein and internal maxillary artery  Facial nerve, hypoglossal nerve, spinal accessory nerve all preserved  Facial nerve superior division thinned likely from long term stretch from mass effect  No intraoperative pharyngotomy    Indications:  Josafat Elder is a 46 year old man who was incidentally found to have an extremely large right parapharyngeal space mass extending up to the skull base, who is indicated for a transparotid-transcervical approach for excision.    Description of  Procedure:  After informed consent was obtained, the patient was brought back to the main operating room and placed in a supine position. General anesthesia was induced and the patient was orotracheally intubated. The bed was turned 180 degrees from anesthesia. A flowers and arterial line were placed. [facility] and [facility]. The modified Cheko incision was marked extending within a preauricular crease, behind the ear toward the hairline, and into the neck, approximately 2 fingers below the mandible. The incision was injected with 1:100,000 epinephrine. The patient was prepped and draped in sterile fashion.    The 15 blade was used to make the incision through the skin. Inferiorly the incision was extended down to the platysma with the monopolar cautery. The platysma was divided and a subplatysmal flap was raised both inferiorly and superiorly in the neck. The greater auricular nerve was identified and divided. The external jugular vein was ligated. The skin flap was then raised over the parotid mass, out until the masseter was reached. The anterior border of the SCM was defined and was traced toward the mastoid tip. The posterior belly of the digastric was identified and traced superiorly toward the mastoid. The parotid tail was reflected off the SCM.  The parotid gland was dissected off the tragal cartilage on a broad front and the tragal pointer was identified. We attempted to use the Lee dissector to identify the main trunk of the facial nerve. However, it was unable to identified and there was concern for possible tumor present in the area we were dissecting. We then decided to retrograde trace the nerve. The nerve stimulator was used to identify the buccal branch which was then traced in retrograde fashion toward the main trunk, dividing the overlying parotid gland. Similarly, the marginal mandibular nerve was identified with the nerve stimulator and traced retrograde back toward the  buccal branch, and dividing the overlying parotid gland. The parotid gland between the buccal and marginal branches was released from the masseter and reflected posteriorly. The inferior division of the facial nerve was identified. The zygomatic and temporal branches were identified with the nerve stimulator and similarly retrograde traced. Of note, these branches were extremely thin and difficult to visualize compared to the branches of the lower division, potentially stretched and thinned over time from the mass. The zygomatic and temporal branches were retrograde traced and the overlying parotid gland was divided. These branches were so small, that they were only able to be retrograde traced for a few centimeters. We then went back to retrograde tracing the inferior division back toward the main trunk of the facial nerve, dividing the overlying parotid gland and reflecting the gland superiorly. Once the main trunk was identified, we were able to see the pes and visualize the superior division of the nerve. The superior division of the facial nerve was anterograde traced towards its distal branches, dividing the overlying parotid gland. The superficial lobe of the parotid was finally completely released from the facial nerve and handed off to nursing for permanent pathology.     We then turned our attention to the right neck dissection. The marginal mandibular nerve was traced from the parotid defect and into the neck so that it was clearly delineated. The facial vein was identified and then divided, using the superior portion of the vein to reflect and protect the marginal mandibular nerve. The fascia was unrolled working from lateral to medial off the SCM.  The digastric was defined working from lateral to medial.  The fascia was continued to be unrolled off the SCM towards the floor of the neck, extending down to the level of the omohyoid.  The spinal accessory nerve was identified and traced superiorly towards  its junction with internal jugular vein.  Once this was identified, the spinal accessory nerve was retracted inferiorly.  The contents of level 2B were released off the floor of the neck and the lateral border of the internal jugular vein. The contents of level 2B were handed off to nursing for permanent pathology. The fibrofatty contents of level 2 and 3 were then continued to be released towards the floor of the neck.  The rootlets were identified along the floor.  The omohyoid was identified and defined the inferior border of our dissection. The lateral border of the internal jugular vein was identified. The fibrofatty contents of levels 2 and 3 were released from the carotid sheath working superiorly to inferiorly.  The medial border of our dissection was identified along the lateral border of the omohyoid using the monopolar cautery.  The specimen was then released from the medial border of the internal jugular vein. The specimen was handed off to nursing for permanent pathology.    A bobcock was used to grasp the submandibular gland. The monopolar cautery was used to released the submandibular gland from the digastric. The gland was then released anteriorly. The anterior edge of the mylohyoid was defined and then retracted superiorly. The lingual nerve was identified and the submandibular ganglion was clipped. The submandibular duct was defined and clipped. The remainder of the gland was released, working from anteriorly to posteriorly. The facial artery was identified and divided. The submandibular gland was  Completely released and then handed off to nursing for permanent pathology.    The internal jugular vein was  from the carotid artery. Vessel loops were placed around both vessels and tagged. The digastric tendon was divided with the monopolar cautery, and the ends of the digastric were tagged. The stylohyoid muscle was divided. The spinal accessory nerve was dissected from inferior to superior,  making sure it was defined going up toward the skull base. The hypoglossal nerve was identified in level 1B and dissected superiorly toward the skull base so it was completely in view. At this point, the inferior portion of the parapharyngeal space mass could be visualized as it extended down into the neck. The stylopharyngeus and styloglossus muscles were divided to allow access to the mass. The stylomandibular ligament could be visualized coming across the tumor and this was divided. This allowed improved visualization into the parapharyngeal space under the mandible. Blunt dissection was performed with a kitner to try to release some of the fascial attachments from the mass along its inferior border, under the mandible. Similarly, blunt dissection was used to release the mass from the pharyngeal mucosa and musculature medially. Given the limitations in access, only the inferior-medial portion of the tumor could be released, with inability to extend dissection all the way to the skull base.    Given that the preoperative imaging showed that the tumor originated from the deep lobe of the parotid, we decided to start releasing the facial nerve circumferentially so the deep lobe of the parotid could be removed. Blunt dissection was used to start to free the facial nerve from the deep lobe of the parotid, starting at the main trunk and working out toward the distal branches. The cervical, marginal mandibular, buccal and zygomatic branches were carefully freed from the deep lobe of the parotid. We then continued to bluntly dissect the large parapharyngeal space mass along its anterior and medial borders. The deep lobe of the parotid was carefully dissected from under the facial nerve and released from its attachments to the masseter. The retromandibular vein and internal maxillary artery were divided superiorly so they could be reflected inferiorly with the tumor. As the deep lobe of the parotid was slowly released, we  continued to be able to mobilize the parapharyngeal component of the tumor. Blunt dissection was used to carefully release the tumor medially, working more superiorly until it was finally able to be released along its more superior extent at the skull base. Once this was freed, we were able to reflect the tumor out from under the mandible. Once the tumor was reflected down into the neck. The remainder of the deep lobe of the parotid was released from the inferior division of the facial nerve and off the mastoid, taking care near the facial nerve. The deep lobe of the parotid and the parapharyngeal space mass were eventually completely released and handed off to nursing for permanent pathology. The tumor did not appear to be violated.     The defect was inspected. The facial nerve was stimulated at 0.8 mA with electrical activity in all branches, but with decreased movement in the lower division, likely from praxis from retraction on the nerve. The spinal accessory and hypoglossal nerves were intact. The glossopharyngeal nerve was seen within the parapharyngeal space, along the pharyngeal musculature. There was no obvious injury to the pharyngeal mucosa. The wound was thoroughly irrigated. Hemostasis was achieved. A valsalva was performed multiple times with continued hemostasis obtained. There was some oozing from the parapharyngeal space, along the pterygoids. Small branches of a venous plexus were attempted to be clipped and tied. Eventually the area was packed with surgicel with hemostasis achieved. A 10 mm fully perforated RUTH drain was placed in the parapharyngeal space and a second RUTH drain was placed in the gutter of the neck. These were secured with 3-0 nylon suture. There was then some bleeding noted deep to the facial nerve, near the masseter. The remnant of the retromandibular vein, and superficial temporal vessels were again clipped. The wound was again irrigated. Repeat valsalva was performed with no  further bleeding. The incision was closed with buried 3-0 vicryl followed by a running 5-0 prolene.     The drapes were removed from the patient. Dental guards were placed on the teeth. The Dedo laryngoscope was introduced and used to visualize the pharynx and larynx, with no obvious pharyngeal injuries, and no bleeding within the pharynx. The laryngoscope was removed along with the tooth guards. The patient was then turned back to Anesthesia, extubated, and taken to the PACU in stable condition with no immediate complications.        Given the extremely large size of the tumor, the excision was complicated and took almost 9 hours to perform.    I was present for and participated in the entire procedure.     Sarita Wilson MD    Department of Otolaryngology

## 2017-03-16 NOTE — IP AVS SNAPSHOT
[address] [address]    Phone:  [phone]                                       After Visit Summary   3/16/2017    Josafat Elder    MRN: [id number]           After Visit Summary Signature Page     I have received my discharge instructions, and my questions have been answered. I have discussed any challenges I see with this plan with the nurse or doctor.    ..........................................................................................................................................  Patient/Patient Representative Signature      ..........................................................................................................................................  Patient Representative Print Name and Relationship to Patient    ..................................................               ................................................  Date                                            Time    ..........................................................................................................................................  Reviewed by Signature/Title    ...................................................              ..............................................  Date                                                            Time

## 2017-03-16 NOTE — ANESTHESIA PROCEDURE NOTES
Arterial Line Procedure Note  Staff:     Anesthesiologist:  JOHNNY VIDALES  Location: In OR After Induction  Procedure Start/Stop Times:     patient identified, IV checked, site marked, risks and benefits discussed, informed consent, monitors and equipment checked and pre-op evaluation      Correct Patient: Yes      Correct Position: Yes      Correct Site: Yes      Correct Procedure: Yes      Correct Laterality:  Yes    Site Marked:  Yes  Line Placement:     Procedure:  Arterial Line    Insertion Site:  Radial    Insertion laterality:  Left    Skin Prep: Chloraprep      Patient Prep: mask and hand hygiene      Local skin infiltration:  None    Ultrasound Guided?: No      Catheter size:  20 gauge, Quick cath    Cath secured with: other (comment)      Cath secured with comment:  Tape and tegaderm    Dressing:  Tegaderm    Complications:  None obvious    Arterial waveform: Yes      IBP within 10% of NIBP: Yes

## 2017-03-17 ENCOUNTER — APPOINTMENT (OUTPATIENT)
Dept: OCCUPATIONAL THERAPY | Facility: CLINIC | Age: 47
DRG: 130 | End: 2017-03-17
Attending: OTOLARYNGOLOGY
Payer: COMMERCIAL

## 2017-03-17 ENCOUNTER — APPOINTMENT (OUTPATIENT)
Dept: GENERAL RADIOLOGY | Facility: CLINIC | Age: 47
DRG: 130 | End: 2017-03-17
Attending: OTOLARYNGOLOGY
Payer: COMMERCIAL

## 2017-03-17 LAB
ANION GAP SERPL CALCULATED.3IONS-SCNC: 9 MMOL/L (ref 3–14)
BASOPHILS # BLD AUTO: 0 10E9/L (ref 0–0.2)
BASOPHILS NFR BLD AUTO: 0.1 %
BUN SERPL-MCNC: 12 MG/DL (ref 7–30)
CALCIUM SERPL-MCNC: 8.5 MG/DL (ref 8.5–10.1)
CHLORIDE SERPL-SCNC: 102 MMOL/L (ref 94–109)
CO2 SERPL-SCNC: 27 MMOL/L (ref 20–32)
CREAT SERPL-MCNC: 0.99 MG/DL (ref 0.66–1.25)
DIFFERENTIAL METHOD BLD: ABNORMAL
EOSINOPHIL # BLD AUTO: 0 10E9/L (ref 0–0.7)
EOSINOPHIL NFR BLD AUTO: 0 %
ERYTHROCYTE [DISTWIDTH] IN BLOOD BY AUTOMATED COUNT: 12.3 % (ref 10–15)
GFR SERPL CREATININE-BSD FRML MDRD: 80 ML/MIN/1.7M2
GLUCOSE SERPL-MCNC: 172 MG/DL (ref 70–99)
HCT VFR BLD AUTO: 35.5 % (ref 40–53)
HGB BLD-MCNC: 11.8 G/DL (ref 13.3–17.7)
IMM GRANULOCYTES # BLD: 0 10E9/L (ref 0–0.4)
IMM GRANULOCYTES NFR BLD: 0.3 %
LYMPHOCYTES # BLD AUTO: 1.2 10E9/L (ref 0.8–5.3)
LYMPHOCYTES NFR BLD AUTO: 12.1 %
MCH RBC QN AUTO: 32.2 PG (ref 26.5–33)
MCHC RBC AUTO-ENTMCNC: 33.2 G/DL (ref 31.5–36.5)
MCV RBC AUTO: 97 FL (ref 78–100)
MONOCYTES # BLD AUTO: 0.9 10E9/L (ref 0–1.3)
MONOCYTES NFR BLD AUTO: 9.3 %
NEUTROPHILS # BLD AUTO: 7.4 10E9/L (ref 1.6–8.3)
NEUTROPHILS NFR BLD AUTO: 78.2 %
NRBC # BLD AUTO: 0 10*3/UL
NRBC BLD AUTO-RTO: 0 /100
PLATELET # BLD AUTO: 123 10E9/L (ref 150–450)
POTASSIUM SERPL-SCNC: 4.2 MMOL/L (ref 3.4–5.3)
RBC # BLD AUTO: 3.67 10E12/L (ref 4.4–5.9)
SODIUM SERPL-SCNC: 138 MMOL/L (ref 133–144)
WBC # BLD AUTO: 9.5 10E9/L (ref 4–11)

## 2017-03-17 PROCEDURE — 97535 SELF CARE MNGMENT TRAINING: CPT | Mod: GO

## 2017-03-17 PROCEDURE — 40000135 MR OUTSIDE READ

## 2017-03-17 PROCEDURE — 97110 THERAPEUTIC EXERCISES: CPT | Mod: GO

## 2017-03-17 PROCEDURE — 40000133 ZZH STATISTIC OT WARD VISIT

## 2017-03-17 PROCEDURE — 85025 COMPLETE CBC W/AUTO DIFF WBC: CPT | Performed by: OTOLARYNGOLOGY

## 2017-03-17 PROCEDURE — 36415 COLL VENOUS BLD VENIPUNCTURE: CPT | Performed by: OTOLARYNGOLOGY

## 2017-03-17 PROCEDURE — 25000132 ZZH RX MED GY IP 250 OP 250 PS 637: Performed by: OTOLARYNGOLOGY

## 2017-03-17 PROCEDURE — 97165 OT EVAL LOW COMPLEX 30 MIN: CPT | Mod: GO

## 2017-03-17 PROCEDURE — 80048 BASIC METABOLIC PNL TOTAL CA: CPT | Performed by: OTOLARYNGOLOGY

## 2017-03-17 PROCEDURE — 12000001 ZZH R&B MED SURG/OB UMMC

## 2017-03-17 PROCEDURE — 25000128 H RX IP 250 OP 636: Performed by: OTOLARYNGOLOGY

## 2017-03-17 RX ORDER — OXYCODONE HYDROCHLORIDE 5 MG/1
5-10 TABLET ORAL
Qty: 40 TABLET | Refills: 0 | Status: SHIPPED | OUTPATIENT
Start: 2017-03-17 | End: 2017-03-17

## 2017-03-17 RX ORDER — LANOLIN ALCOHOL/MO/W.PET/CERES
3 CREAM (GRAM) TOPICAL
Status: DISCONTINUED | OUTPATIENT
Start: 2017-03-17 | End: 2017-03-19 | Stop reason: HOSPADM

## 2017-03-17 RX ORDER — OXYCODONE HYDROCHLORIDE 5 MG/1
5-10 TABLET ORAL
Qty: 40 TABLET | Refills: 0 | Status: SHIPPED | OUTPATIENT
Start: 2017-03-17 | End: 2017-05-31

## 2017-03-17 RX ADMIN — LORAZEPAM 1 MG: 1 TABLET ORAL at 05:06

## 2017-03-17 RX ADMIN — OXYCODONE HYDROCHLORIDE 10 MG: 5 TABLET ORAL at 01:49

## 2017-03-17 RX ADMIN — OXYCODONE HYDROCHLORIDE 10 MG: 5 TABLET ORAL at 20:27

## 2017-03-17 RX ADMIN — LORAZEPAM 1 MG: 1 TABLET ORAL at 08:38

## 2017-03-17 RX ADMIN — OXYCODONE HYDROCHLORIDE 10 MG: 5 TABLET ORAL at 05:05

## 2017-03-17 RX ADMIN — OXYCODONE HYDROCHLORIDE 10 MG: 5 TABLET ORAL at 11:15

## 2017-03-17 RX ADMIN — CEFAZOLIN SODIUM 2 G: 2 INJECTION, SOLUTION INTRAVENOUS at 01:49

## 2017-03-17 RX ADMIN — MULTIPLE VITAMINS W/ MINERALS TAB 1 TABLET: TAB at 08:11

## 2017-03-17 RX ADMIN — CEFAZOLIN SODIUM 2 G: 2 INJECTION, SOLUTION INTRAVENOUS at 10:45

## 2017-03-17 RX ADMIN — ACETAMINOPHEN 975 MG: 325 TABLET, FILM COATED ORAL at 21:00

## 2017-03-17 RX ADMIN — FOLIC ACID 1 MG: 1 TABLET ORAL at 08:11

## 2017-03-17 RX ADMIN — SENNOSIDES AND DOCUSATE SODIUM 2 TABLET: 8.6; 5 TABLET ORAL at 20:27

## 2017-03-17 RX ADMIN — BACITRACIN: 500 OINTMENT TOPICAL at 08:12

## 2017-03-17 RX ADMIN — OXYCODONE HYDROCHLORIDE 10 MG: 5 TABLET ORAL at 08:11

## 2017-03-17 RX ADMIN — OXYCODONE HYDROCHLORIDE 10 MG: 5 TABLET ORAL at 14:19

## 2017-03-17 RX ADMIN — CEFAZOLIN SODIUM 2 G: 2 INJECTION, SOLUTION INTRAVENOUS at 18:26

## 2017-03-17 RX ADMIN — CARBOXYMETHYLCELLULOSE SODIUM 2 DROP: 10 GEL OPHTHALMIC at 10:45

## 2017-03-17 RX ADMIN — ENOXAPARIN SODIUM 40 MG: 40 INJECTION SUBCUTANEOUS at 14:19

## 2017-03-17 RX ADMIN — SENNOSIDES AND DOCUSATE SODIUM 2 TABLET: 8.6; 5 TABLET ORAL at 08:11

## 2017-03-17 RX ADMIN — Medication 100 MG: at 08:11

## 2017-03-17 RX ADMIN — OXYCODONE HYDROCHLORIDE 10 MG: 5 TABLET ORAL at 17:36

## 2017-03-17 RX ADMIN — BACITRACIN: 500 OINTMENT TOPICAL at 14:19

## 2017-03-17 RX ADMIN — CARBOXYMETHYLCELLULOSE SODIUM 2 DROP: 10 GEL OPHTHALMIC at 08:13

## 2017-03-17 ASSESSMENT — VISUAL ACUITY
OU: BLURRED VISION
OU: BLURRED VISION
OU: NORMAL ACUITY
OU: BLURRED VISION

## 2017-03-17 ASSESSMENT — ACTIVITIES OF DAILY LIVING (ADL): PREVIOUS_RESPONSIBILITIES: MEAL PREP;WORK

## 2017-03-17 NOTE — OR NURSING
ENT team at bedside in PACU to speak with and assess pt. R neck looks the same per MD's and RUTH's draining adequate amount. R lower facial muscles a little weaker, MD's aware-told pt it will take time to get stronger. Will continue to monitor and assess for swelling/hematoma.

## 2017-03-17 NOTE — PLAN OF CARE
Problem: Goal Outcome Summary  Goal: Goal Outcome Summary  POD#1 Total parotidectomy, R neck dissection, and parapharyngeal space mass removal. VSS. A&Ox4. Neuros intact ex numbness to R side of face. Pt has 2 JPs to bulb suction with moderate amount of serosanguinous drainage. Neck incision is SAROJ, sutured, CDI, bacitracin applied. Pt complained of slight pain that was relieved with PRN oxycodone x2 (SEE MAR). Ativan given x1 per MSSA assessment. Pt tremor appeared to worsen with activity, pt did not get much sleep through the night. On regular diet and tolerating well. Pt voiding spont alternating using bedside urinal and getting up to BR.  Up with A1, SBA. R PIV infusing D5% & .45% NS and [id number]  at 100ml/hr between infusions of Cefazolin. Uses call light appropriately. Will continue to monitor and follow POC.

## 2017-03-17 NOTE — PROGRESS NOTES
03/17/17 1200   Quick Adds   Type of Visit Initial Occupational Therapy Evaluation   Living Environment   Lives With spouse   Living Arrangements apartment   Home Accessibility tub/shower is not walk in;stairs to enter home   Number of Stairs to Enter Home 2   Number of Stairs Within Home 0   Stair Railings at Home none   Transportation Available family or friend will provide   Living Environment Comment Pt lives in apartment with wife, both work and are gone during the day.   Self-Care   Dominant Hand right   Usual Activity Tolerance excellent   Current Activity Tolerance good   Regular Exercise yes   Activity/Exercise Type strength training   Exercise Amount/Frequency 5-7 times/wk;45 mins   Equipment Currently Used at Home none   Activity/Exercise/Self-Care Comment Pt IND in all ADLs and IADLs, however patient states he does not drive. 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   Functional Level Prior   Ambulation 0-->independent   Transferring 0-->independent   Toileting 0-->independent   Bathing 0-->independent   Dressing 0-->independent   Eating 0-->independent   Communication 0-->understands/communicates without difficulty   Swallowing 0-->swallows foods/liquids without difficulty   Cognition 0 - no cognition issues reported   Fall history within last six months no   Which of the above functional risks had a recent onset or change? none   Prior Functional Level Comment Independent   General Information   Onset of Illness/Injury or Date of Surgery - Date 03/16/17   Referring Physician Germán Olguin   Additional Occupational Profile Info/Pertinent History of Current Problem Josafat Elder is a 46 year old male with a past medical history of parapharyngeal space mass now POD#1 s/p right parotidectomy, right MRND, and excision of right parapharyngeal space mass.    Precautions/Limitations no known precautions/limitations   Weight-Bearing Status - LUE full weight-bearing   Weight-Bearing Status - RUE full weight-bearing    Weight-Bearing Status - LLE full weight-bearing   Weight-Bearing Status - RLE full weight-bearing   Cognitive Status Examination   Orientation orientation to person, place and time   Level of Consciousness alert   Visual Perception   Visual Perception Comments Pt states random blurred vision in R eye   Sensory Examination   Sensory Comments Pt reports N/T in R side of face   Pain Assessment   Patient Currently in Pain No   Integumentary/Edema   Integumentary/Edema no deficits were identifed   Posture   Posture not impaired   Range of Motion (ROM)   ROM Quick Adds No deficits were identified   ROM Comment WFL in BUE   Strength   Strength Comments Overall strength 5/5 in BUE   Hand Strength   Hand Strength Comments WFL in BUE   Coordination   Fine Motor Coordination WFL in BUE   Transfer Skill: Sit to Stand   Level of [place]: Sit/Stand independent   Balance   Balance Quick Add No deficits identified   Lower Body Dressing   Level of [place]: Dress Lower Body independent   Instrumental Activities of Daily Living (IADL)   Previous Responsibilities meal prep;work   Activities of Daily Living Analysis   Impairments Contributing to Impaired Activities of Daily Living ROM decreased   General Therapy Interventions   Planned Therapy Interventions ADL retraining;ROM   Clinical Impression   Criteria for Skilled Therapeutic Interventions Met yes, treatment indicated   OT Diagnosis Decreased neck/shoulder ROM   Influenced by the following impairments R neck dissection   Assessment of Occupational Performance 1-3 Performance Deficits   Identified Performance Deficits Dressing, transfers   Clinical Decision Making (Complexity) Low complexity   Therapy Frequency other (see comments)  (1 time eval and treat)   Predicted Duration of Therapy Intervention (days/wks) 1 day   Anticipated Discharge Disposition Home with Assist   Risks and Benefits of Treatment have been explained. Yes   Patient, Family & other staff in  agreement with plan of care Yes   Total Evaluation Time   Total Evaluation Time (Minutes) 6

## 2017-03-17 NOTE — PLAN OF CARE
Problem: Goal Outcome Summary  Goal: Goal Outcome Summary  OT 6A: OT evaluation and treatment completed. Patient performs neck/shoulder exercises x15 reps and provided with handout for further performance during hospital stay and discharge. Patient also educated on modified upper body dressing techniques to compensate for R neck pain, able to perform with min cues. Patient independent in mobility and lower body dressing. No further acute OT needs at this time, OT to complete orders. Please reconsult if changes arise. Recommend: home with family assist as needed, OT discussed gradual return to work.     Occupational Therapy Discharge Summary     Reason for therapy discharge:    All goals and outcomes met, no further needs identified.     Progress towards therapy goal(s). See goals on Care Plan in [facility] electronic health record for goal details.  Goals met     Therapy recommendation(s):    No further therapy is recommended.

## 2017-03-17 NOTE — PLAN OF CARE
Problem: Goal Outcome Summary  Goal: Goal Outcome Summary  Outcome: Improving  POD#1 s/p total parotidectomy, R neck dissection, and parapharyngeal space mass removal. VSS. A&Ox4. Neuros intact ex numbness to R side of face; pt states this is improving. 2 RUTH's in neck to bulb suction with moderate amount of serosanguinous drainage (35mL and 55mL at 1500). Neck incision closed w/sutures, SAROJ, bacitracin applied per order. Pt c/o pain in R side of face and neck, PRN oxycodone given x4. MSSA scores of 10, 7 and 7; Ativan given x1. Tremor improved throughout the day, but still present. Tolerating a reg diet. Void spont, no BM this shift. PIV SL'd between abx. Continue to monitor and follow POC.

## 2017-03-17 NOTE — PROGRESS NOTES
"Otolaryngology Progress Note  March 17, 2017    S: No acute events overnight. Patient reports he is doing well, pain is well controlled, tolerating a regular diet. Ativan given x 1 per MSSA protocol due to tremor.     O: /88  Pulse 94  Temp 99  F (37.2  C) (Oral)  Resp 16  Ht 1.778 m (5' 10\")  Wt 83 kg (182 lb 15.7 oz)  SpO2 94%  BMI 26.26 kg/m2   General: Alert and oriented x 3, No acute distress   HEENT: EOMI. Right marginal mandibular nerve branch weakness, remainder of facial nerve intact. Right neck incision c/d/i, RUTH drains x 2 in place holding bulb suction with bloody sanguinous drainage in bulbs. Neck flat without evidence of hematoma. Dusky appearance medial to incision site.    Pulmonary: Breathing non-labored, no stridor, no accessory muscle use.      Intake/Output Summary (Last 24 hours) at 03/17/17 1150  Last data filed at 03/17/17 0400   Gross per 24 hour   Intake             1730 ml   Output             3100 ml   Net            -1370 ml     RUTH drain output(s): (last 24 hours)/(last shift)  1 Right neck: 50 / 25 = 75 mL  2 Right neck: 135 / 30 = 165 mL     LABS:  ROUTINE IP LABS (Last four results)  BMP  Recent Labs  Lab 03/17/17  0749 03/16/17  2149 03/16/17  1500     --  139   POTASSIUM 4.2  --  4.6   CHLORIDE 102  --   --    DANI 8.5  --   --    CO2 27  --   --    BUN 12  --   --    CR 0.99 0.94  --    *  --  142*     CBC  Recent Labs  Lab 03/17/17  0749 03/16/17  2149 03/16/17  1500 03/16/17  0615   WBC 9.5  --   --   --    RBC 3.67*  --   --   --    HGB 11.8*  --  14.2 15.1   HCT 35.5*  --   --   --    MCV 97  --   --   --    MCH 32.2  --   --   --    MCHC 33.2  --   --   --    RDW 12.3  --   --   --    * 156  --   --      INRNo lab results found in last 7 days.    A/P: Josafat Elder is a 46 year old male with a past medical history of parapharyngeal space mass now POD#1 s/p right parotidectomy, right MRND, and excision of right parapharyngeal space mass.   Neuro:  - " Pain control: acetaminophen, oxycodone PRN, IV dilaudid PRN for breakthrough pain  - MSSA protocol for EtOH withdrawal monitoring    HEENT:  - Incision cares: clean with 0.9% sodium chloride and apply Aquaphor Q8H   - Monitor and record RUTH drain output Qshift  - Refresh eye drops to right eye PRN    Respiratory:  - supplemental O2 PRN to keep sats >92%    CV/heme:  - hemodynamically stable, post-op hgb 11.8    FEN/GI:  - Regular diet  - Bowel regimen: Pericolace  - Folic acid, thiamine supplements    :  - voiding independently    Endo  - No current issues    ID:  - Ancef x 3 doses post-op    PPX:  - Lovenox 40mg daily  - SCDs  - IS  Dispo: Anticipate discharge to home in 1-2 days pending drain output. Will likely discharge home with 1 RUTH drain    -- Patient and above plan discussed with Dr. Katie Hector, PA-C  Otolaryngology-Head & Neck Surgery  Please contact ENT with questions by dialing [phone] and entering job code [phone] when prompted.

## 2017-03-17 NOTE — PLAN OF CARE
Problem: Goal Outcome Summary  Goal: Goal Outcome Summary  Outcome: No Change  Pt transferred from PACU around 2100. POD#0 Total parotidectomy, R neck dissection, and parapharyngeal space mass removal. VSS. A&Ox4. Neuros intact ex numbness to R side of face. Pt has 2 JPs to bulb suction with moderate amount of serosanguinous drainage. Neck incision is SAROJ, sutured, CDI, bacitracin applied. Pt complained of slight pain that was relieved with oxycodone. On regular diet and tolerating well. Arnold removed at 2000 and has not yet voided. Up with A1 and GB. MIVF at 100ml/hr. Uses call light appropriately. Will continue to monitor and follow POC.

## 2017-03-17 NOTE — ANESTHESIA POSTPROCEDURE EVALUATION
Patient: Josafat Elder    Procedure(s):  total parotidectomy, neck dissection, parapharyngeal space mass removal ,  Direct Laryngoscopy - Wound Class: I-Clean   - Wound Class: II-Clean Contaminated    Diagnosis:Parapharyngeal Mass   Diagnosis Additional Information: No value filed.    Anesthesia Type:  General, ETT    Note:  Anesthesia Post Evaluation    Patient location during evaluation: PACU  Patient participation: Able to fully participate in evaluation  Level of consciousness: awake and alert  Pain management: adequate  Airway patency: patent  Cardiovascular status: blood pressure returned to baseline  Respiratory status: acceptable  Hydration status: euvolemic  PONV: none             Last vitals:  Vitals:    03/16/17 0620 03/16/17 1949 03/16/17 2000   BP:      Resp: 16 (P) 14 (P) 16   Temp:  (P) 36.8  C (98.3  F)    SpO2:            Electronically Signed By: Mal Evans MD  March 16, 2017  8:08 PM

## 2017-03-17 NOTE — BRIEF OP NOTE
[facility], [place]    Brief Operative Note    Pre-operative diagnosis: Parapharyngeal Mass   Post-operative diagnosis Same  Procedure: Procedure(s):  total parotidectomy, neck dissection, parapharyngeal space mass removal ,  Direct Laryngoscopy - Wound Class: I-Clean   - Wound Class: II-Clean Contaminated  Surgeon: Surgeon(s) and Role:     * Sarita Wilson MD - Primary     * Kim Baron MD - Resident - Assisting  Anesthesia: General   Estimated blood loss: 250 ml  Drains: Jose-Joe x 2 right neck  Specimens:   ID Type Source Tests Collected by Time Destination   A : Right superficial parotidectomy Tissue Neck SURGICAL PATHOLOGY EXAM Sarita Wilson MD 3/16/2017 12:35 PM    B : Right Level 2B Neck Dissection Tissue Neck SURGICAL PATHOLOGY EXAM Sarita Wilson MD 3/16/2017 12:58 PM    C : Right Level 2A, 3 Neck Dissection Tissue Neck SURGICAL PATHOLOGY EXAM Sarita Wilson MD 3/16/2017  1:25 PM    D : Right Submandibular Gland Tissue Neck SURGICAL PATHOLOGY EXAM Sarita Wilson MD 3/16/2017  1:41 PM    E : Right Additional Parotid over Facial Nerve Tissue Neck SURGICAL PATHOLOGY EXAM Sarita Wilson MD 3/16/2017  4:34 PM      Findings:   Right deep lobe of parotid tumor. No pharyngotomy occurred.  Complications: None.  Implants: None.

## 2017-03-17 NOTE — ANESTHESIA CARE TRANSFER NOTE
Patient: Josafat Elder    Procedure(s):  total parotidectomy, neck dissection, parapharyngeal space mass removal ,  Direct Laryngoscopy - Wound Class: I-Clean   - Wound Class: II-Clean Contaminated    Diagnosis: Parapharyngeal Mass   Diagnosis Additional Information: No value filed.    Anesthesia Type:   General, ETT     Note:  Airway :Room Air  Patient transferred to:PACU  Comments: Patient extubated deep. Woke up and followed commands after extubation. Denies pain or nausea.      Vitals: (Last set prior to Anesthesia Care Transfer)    CRNA VITALS  3/16/2017 1917 - 3/16/2017 1954      3/16/2017             Resp Rate (set): 10                Electronically Signed By: Madeline Prajapati MD  March 16, 2017  7:54 PM

## 2017-03-18 PROCEDURE — 25000132 ZZH RX MED GY IP 250 OP 250 PS 637: Performed by: OTOLARYNGOLOGY

## 2017-03-18 PROCEDURE — 12000001 ZZH R&B MED SURG/OB UMMC

## 2017-03-18 PROCEDURE — 25000128 H RX IP 250 OP 636: Performed by: OTOLARYNGOLOGY

## 2017-03-18 RX ADMIN — OXYCODONE HYDROCHLORIDE 10 MG: 5 TABLET ORAL at 07:44

## 2017-03-18 RX ADMIN — ENOXAPARIN SODIUM 40 MG: 40 INJECTION SUBCUTANEOUS at 13:51

## 2017-03-18 RX ADMIN — SENNOSIDES AND DOCUSATE SODIUM 2 TABLET: 8.6; 5 TABLET ORAL at 20:04

## 2017-03-18 RX ADMIN — OXYCODONE HYDROCHLORIDE 10 MG: 5 TABLET ORAL at 13:51

## 2017-03-18 RX ADMIN — OXYCODONE HYDROCHLORIDE 10 MG: 5 TABLET ORAL at 20:04

## 2017-03-18 RX ADMIN — FOLIC ACID 1 MG: 1 TABLET ORAL at 07:44

## 2017-03-18 RX ADMIN — Medication 100 MG: at 07:45

## 2017-03-18 RX ADMIN — SENNOSIDES AND DOCUSATE SODIUM 2 TABLET: 8.6; 5 TABLET ORAL at 07:44

## 2017-03-18 RX ADMIN — OXYCODONE HYDROCHLORIDE 10 MG: 5 TABLET ORAL at 17:00

## 2017-03-18 RX ADMIN — WHITE PETROLATUM: 1.75 OINTMENT TOPICAL at 20:51

## 2017-03-18 RX ADMIN — WHITE PETROLATUM: 1.75 OINTMENT TOPICAL at 13:51

## 2017-03-18 RX ADMIN — MULTIPLE VITAMINS W/ MINERALS TAB 1 TABLET: TAB at 07:44

## 2017-03-18 RX ADMIN — OXYCODONE HYDROCHLORIDE 10 MG: 5 TABLET ORAL at 10:49

## 2017-03-18 ASSESSMENT — VISUAL ACUITY
OU: BLURRED VISION

## 2017-03-18 NOTE — PLAN OF CARE
Problem: Goal Outcome Summary  Goal: Goal Outcome Summary  Outcome: Improving  Patient is here for Sz characterization, she is slow to respond, she has general body weakness and some tremors on her upper extremities. Patient denies pain, PIV SL, up to bathroom with assist of one and a GB. EEG leads intact and no SZ event at night. Continue with POC    Problem: Pain, Acute (Adult)  Goal: Acceptable Pain Control/Comfort Level  Patient will demonstrate the desired outcomes by discharge/transition of care.   Outcome: No Change  Patient is here for Sz characterization, she is slow to respond, she has general body weakness and some tremors on her upper extremities. Patient denies pain, PIV SL, up to bathroom with assist of one and a GB. EEG leads intact and no SZ event at night. Continue with POC.

## 2017-03-18 NOTE — PLAN OF CARE
Problem: Goal Outcome Summary  Goal: Goal Outcome Summary  Outcome: Improving  POD#2 S/P total parotidectomy, neck dissection, and parapharyngeal space mass removal. AVSS, pt refused his 2200 o clock vitals, neuros intact except for the numbness on her right face. Neck incision is clean and moist from ointment. 2 RUTH's in neck to bulb suction with moderate amount of serosanguinous drainage. Tylenol and Oxycodone for pain with good relief. Patient is on MSSA protocol and he scored scores of 4 and did not need any coverage. Voiding spontaneously, no BM yet, continue to monitor and follow POC.

## 2017-03-18 NOTE — PROGRESS NOTES
"Otolaryngology Progress Note  March 18, 2017    S: No acute events overnight. Patient reports he is doing well, pain is well controlled, tolerating a regular diet. He is hoping to leave ASAP. OT has no concerns about his shoulder strength.     O: BP (!) 147/98 (BP Location: Right arm)  Pulse 88  Temp 98.8  F (37.1  C) (Oral)  Resp 16  Ht 1.778 m (5' 10\")  Wt 83 kg (182 lb 15.7 oz)  SpO2 98%  BMI 26.26 kg/m2   General: Alert and oriented x 3, No acute distress, in street clothes   HEENT: EOMI. Right marginal mandibular nerve branch weakness, remainder of facial nerve intact. Right neck incision c/d/i, RUTH drains x 2 in place holding bulb suction with serosanguinous drainage in bulbs. Neck flat without evidence of hematoma. Dusky appearance medial to incision site.    Pulmonary: Breathing non-labored, no stridor, no accessory muscle use.    Intake/Output Summary (Last 24 hours) at 03/18/17 1136  Last data filed at 03/18/17 0659   Gross per 24 hour   Intake                0 ml   Output              212 ml   Net             -212 ml       RUTH drain output(s): (last 24 hours)/(last shift)  1 Right neck: 35 / 30 / 32 = 97 mL  2 Right neck: 55 / 30 / 30 = 105 mL     LABS:  ROUTINE IP LABS (Last four results)  [facility]    Recent Labs  Lab 03/17/17  0749 03/16/17  2149 03/16/17  1500     --  139   POTASSIUM 4.2  --  4.6   CHLORIDE 102  --   --    DANI 8.5  --   --    CO2 27  --   --    BUN 12  --   --    CR 0.99 0.94  --    *  --  142*     CBC    Recent Labs  Lab 03/17/17  0749 03/16/17  2149 03/16/17  1500 03/16/17  0615   WBC 9.5  --   --   --    RBC 3.67*  --   --   --    HGB 11.8*  --  14.2 15.1   HCT 35.5*  --   --   --    MCV 97  --   --   --    MCH 32.2  --   --   --    MCHC 33.2  --   --   --    RDW 12.3  --   --   --    * 156  --   --      INRNo lab results found in last 7 days.    A/P: Josafat Elder is a 46 year old male with a past medical history of parapharyngeal space mass now POD#2 s/p right " parotidectomy, right MRND, and excision of right parapharyngeal space mass.   Neuro:  - Pain control: acetaminophen, oxycodone PRN, IV dilaudid PRN for breakthrough pain  - MSSA protocol for EtOH withdrawal monitoring    HEENT:  - Incision cares: clean with 0.9% sodium chloride and apply Aquaphor Q8H   - Monitor and record RUTH drain output Qshift  - Refresh eye drops to right eye PRN    Respiratory:  - supplemental O2 PRN to keep sats >92%    CV/heme:  - hemodynamically stable, post-op hgb 11.8    FEN/GI:  - Regular diet  - Bowel regimen: Pericolace  - Folic acid, thiamine supplements    :  - voiding independently    Endo  - No current issues    ID:  - Ancef x 3 doses post-op completed    PPX:  - Lovenox 40mg daily  - SCDs  - IS  Dispo: Anticipate discharge to home in 1-2 days pending drain output. Will likely discharge home with 1 RUTH drain    -- Patient and above plan discussed with Dr. Katie Dozier  Otolaryngology-Head & Neck Surgery  Please contact ENT with questions by dialing [phone] and entering job code [phone] when prompted.

## 2017-03-19 VITALS
HEART RATE: 93 BPM | WEIGHT: 182.98 LBS | RESPIRATION RATE: 16 BRPM | DIASTOLIC BLOOD PRESSURE: 106 MMHG | BODY MASS INDEX: 26.2 KG/M2 | TEMPERATURE: 98.4 F | OXYGEN SATURATION: 99 % | HEIGHT: 70 IN | SYSTOLIC BLOOD PRESSURE: 155 MMHG

## 2017-03-19 LAB — PLATELET # BLD AUTO: 142 10E9/L (ref 150–450)

## 2017-03-19 PROCEDURE — 25000132 ZZH RX MED GY IP 250 OP 250 PS 637: Performed by: OTOLARYNGOLOGY

## 2017-03-19 PROCEDURE — 36415 COLL VENOUS BLD VENIPUNCTURE: CPT | Performed by: OTOLARYNGOLOGY

## 2017-03-19 PROCEDURE — 85049 AUTOMATED PLATELET COUNT: CPT | Performed by: OTOLARYNGOLOGY

## 2017-03-19 PROCEDURE — 25000128 H RX IP 250 OP 636: Performed by: OTOLARYNGOLOGY

## 2017-03-19 RX ORDER — AMOXICILLIN 250 MG
1-2 CAPSULE ORAL 2 TIMES DAILY
Qty: 20 TABLET | Refills: 0 | Status: SHIPPED
Start: 2017-03-19 | End: 2017-05-31

## 2017-03-19 RX ORDER — ACETAMINOPHEN 325 MG/1
650 TABLET ORAL EVERY 4 HOURS PRN
Qty: 50 TABLET | Refills: 0 | Status: SHIPPED
Start: 2017-03-19 | End: 2017-03-19

## 2017-03-19 RX ORDER — MINERAL OIL/HYDROPHIL PETROLAT
1 OINTMENT (GRAM) TOPICAL EVERY 8 HOURS
Qty: 50 G | Refills: 0 | Status: SHIPPED
Start: 2017-03-19 | End: 2017-05-31

## 2017-03-19 RX ADMIN — Medication 100 MG: at 08:00

## 2017-03-19 RX ADMIN — ENOXAPARIN SODIUM 40 MG: 40 INJECTION SUBCUTANEOUS at 13:58

## 2017-03-19 RX ADMIN — FOLIC ACID 1 MG: 1 TABLET ORAL at 08:00

## 2017-03-19 RX ADMIN — SENNOSIDES AND DOCUSATE SODIUM 2 TABLET: 8.6; 5 TABLET ORAL at 08:00

## 2017-03-19 RX ADMIN — OXYCODONE HYDROCHLORIDE 10 MG: 5 TABLET ORAL at 13:58

## 2017-03-19 RX ADMIN — OXYCODONE HYDROCHLORIDE 10 MG: 5 TABLET ORAL at 10:54

## 2017-03-19 RX ADMIN — OXYCODONE HYDROCHLORIDE 10 MG: 5 TABLET ORAL at 07:50

## 2017-03-19 RX ADMIN — WHITE PETROLATUM: 1.75 OINTMENT TOPICAL at 13:58

## 2017-03-19 RX ADMIN — MULTIPLE VITAMINS W/ MINERALS TAB 1 TABLET: TAB at 08:00

## 2017-03-19 RX ADMIN — WHITE PETROLATUM: 1.75 OINTMENT TOPICAL at 08:00

## 2017-03-19 ASSESSMENT — VISUAL ACUITY
OU: BLURRED VISION
OU: BLURRED VISION

## 2017-03-19 NOTE — PLAN OF CARE
Problem: Goal Outcome Summary  Goal: Goal Outcome Summary  Outcome: Adequate for Discharge Date Met:  03/19/17  POD#3 s/p total parotidectomy, R neck dissection, and parapharyngeal space mass removal. VSS. A&Ox4. Neuros intact ex numbness to R side of face; pt states this is improving. 1 RUTH removed by MD, 2nd RUTH remains in place. Neck incision closed w/sutures, SAROJ, aquaphor applied per order. Pt c/o pain in R side of face and neck, PRN oxycodone given x3. MSSA score of 5; no intervention needed. Tremor improving, currently not visible, but felt upon exam.Tolerating a reg diet. Void spont, no BM this shift. RUTH care instructions and discharge instructions discussed w/pt and spouse, questions answered. Pt discharged home w/wife.

## 2017-03-19 NOTE — DISCHARGE SUMMARY
"[facility]   Otolaryngology Discharge Summary    Date of Admission: 3/16/2017  Date of Discharge: 3/19/2017    Admission Diagnosis:  1. Right parapharyngeal space mass    Discharge Diagnosis:  1. Acute post-operative pain    2. Facial nerve paresis    3. Parapharyngeal space mass    4. Drug induced constipation        Consultations:  None    Procedures:  3/16/2017  Right total parotidectomy with facial nerve dissection  Right neck dissection (levels 1B, 2, 3)  Removal of parapharyngeal space mass  Direct laryngoscopy    Pathology:  Pending at discharge; final pathology showing pleomorphic adenoma    Brief HPI:  Josafat Elder is a 46 year old man who was incidentally found to have an extremely large right parapharyngeal space mass extending up to the skull base, who is indicated for a transparotid-transcervical approach for excision.    Hospital Course:  He tolerated the above procedure well without compilcation. His facial nerve was weak (House Brackmann 3) initially due to the extend of dissection, but improved overtime to House Brackmann 2. He had 2 RUTH drains placed initially. 1 drain was removed prior to discharge. His diet was slowly advanced as bowel function returned. His pain was controlled with oral pain medication and he was able to ambulate and void without difficulty. He received appropriate education post operatively. On POD #3 he was discharged home.    Discharge Physical Exam:  BP (!) 155/106 (BP Location: Right arm)  Pulse 93  Temp 98.4  F (36.9  C) (Oral)  Resp 16  Ht 1.778 m (5' 10\")  Wt 83 kg (182 lb 15.7 oz)  SpO2 99%  BMI 26.26 kg/m2    General: Alert and oriented x 3, No acute distress, in street clothes  HEENT: EOMI. Right facial nerve HB 2, able to completely close right eye. Right neck incision c/d/i, RUTH drains x 1 in place holding bulb suction with serosanguinous output. Neck flat without evidence of hematoma.   Pulmonary: Breathing non-labored, no " stridor, no accessory muscle use.    Labs:  Results for orders placed or performed during the hospital encounter of 03/16/17 (from the past 24 hour(s))   Platelet count   Result Value Ref Range    Platelet Count 142 (L) 150 - 450 10e9/L        Meds:     Review of your medicines      START taking       Dose / Directions    carboxymethylcellulose 1 % ophthalmic solution   Commonly known as:  CELLUVISC/REFRESH LIQUIGEL   Used for:  Facial nerve paresis        Dose:  2 drop   Place 2 drops into the right eye every 2 hours as needed for dry eyes   Quantity:  1 Bottle   Refills:  0       mineral oil-hydrophilic petrolatum   Used for:  Parapharyngeal space mass        Dose:  1 g   Apply 1 g topically every 8 hours To neck incision   Quantity:  50 g   Refills:  0       oxyCODONE 5 MG IR tablet   Commonly known as:  ROXICODONE   Used for:  Acute post-operative pain        Dose:  5-10 mg   Take 1-2 tablets (5-10 mg) by mouth every 3 hours as needed for moderate to severe pain   Quantity:  40 tablet   Refills:  0       senna-docusate 8.6-50 MG per tablet   Commonly known as:  SENOKOT-S;PERICOLACE   Used for:  Drug induced constipation        Dose:  1-2 tablet   Take 1-2 tablets by mouth 2 times daily   Quantity:  20 tablet   Refills:  0         CONTINUE these medicines which have NOT CHANGED       Dose / Directions    Multi-vitamin Tabs tablet        Dose:  1 tablet   Take 1 tablet by mouth daily   Refills:  0            Where to get your medicines      These medications were sent to [facility] [place], [place] - [address]  [address] [address]     Phone:  [phone]      carboxymethylcellulose 1 % ophthalmic solution     mineral oil-hydrophilic petrolatum     senna-docusate 8.6-50 MG per tablet         Some of these will need a paper prescription and others can be bought over the counter. Ask your nurse if you have questions.     Bring a paper prescription for each of these  medications      oxyCODONE 5 MG IR tablet                Discharge Procedure Orders  Reason for your hospital stay   Order Comments: Neck surgery     Adult [facility] Follow-up and recommended labs and tests   Order Comments: Follow up with Dr. Wilson , at ENT clinic on 3/24/17  Appointments on [place] and/or [facility] (with [facility] or [facility] provider or service). Call [phone] if you haven't heard regarding these appointments within 7 days of discharge.     Activity   Order Comments: Your activity upon discharge: activity as tolerated   Order Specific Question Answer Comments   Is discharge order? Yes      Wound care and dressings   Order Comments: Instructions to care for your wound at home: apply Aquaphor to wound three times a day.     Full Code     Diet   Order Comments: Follow this diet upon discharge: Regular   Order Specific Question Answer Comments   Is discharge order? Yes          Pending Labs:  Surgical pathology    Germán Olguin  Otolaryngology Resident - PGY1  Please page ENT with questions by dialing [phone] and entering job code [phone] when prompted       Teaching statement:  I, Sarita Wilson, saw and evaluated this patient prior to discharge. I discussed the patient with the resident and agree with plan of care as documented in the resident note. I personally spent 10 minutes on discharge activities.     Sarita Wilson MD    Department of Otolaryngology

## 2017-03-19 NOTE — PLAN OF CARE
Problem: Goal Outcome Summary  Goal: Goal Outcome Summary  Outcome: Improving  POD#2 S/P total parotidectomy, neck dissection, and parapharyngeal space mass removal. AVSS, pt refused his 2200 o clock vitals, neuros intact except for the numbness on his right face. Neck incision is clean and moist from ointment. 2 RUTH's in neck to bulb suction with moderate amount of serosanguinous drainage. Tylenol and Oxycodone for pain with good relief. Patient is on MSSA protocol and he scored scores of 3 and did not need any coverage. Tremor improved throughout the day, but still present. Voiding spontaneously, no BM yet, continue to monitor and follow POC.

## 2017-03-20 ENCOUNTER — CARE COORDINATION (OUTPATIENT)
Dept: CARDIOLOGY | Facility: CLINIC | Age: 47
End: 2017-03-20

## 2017-03-20 LAB
BLD PROD TYP BPU: NORMAL
BLD PROD TYP BPU: NORMAL
BLD UNIT ID BPU: 0
BLD UNIT ID BPU: 0
BLOOD PRODUCT CODE: NORMAL
BLOOD PRODUCT CODE: NORMAL
BPU ID: NORMAL
BPU ID: NORMAL
TRANSFUSION STATUS PATIENT QL: NORMAL

## 2017-03-20 NOTE — PROGRESS NOTES
"[facility]  \"Hello, my name is Dottie Marin , and I am calling from the [facility].  I want to check in and see how you are doing, after leaving the hospital.  You may also receive a call from your Care Coordinator (care team), but I want to make sure you don t have any urgent needs.  I have a couple questions to review with you:     Post-Discharge Outreach                                                    Josafat Elder is a 46 year old male     Follow-up Appointments           Adult [facility] Follow-up and recommended labs and tests       Follow up with Dr. Wilson , at ENT clinic on 3/24/17  Appointments on [facility] and/or [facility] (with [facility] or [facility] provider or service). Call [phone] if you haven't heard regarding these appointments within 7 days of discharge.                       Your next 10 appointments already scheduled            Mar 24, 2017 2:20 PM CDT   (Arrive by 2:05 PM)   RETURN TUMOR VISIT with Sarita Wilson MD   [facility] Ear Nose [facility] Th[facility]at ([facility] [place])              Care Team:    Patient Care Team       Relationship Specialty Notifications Start End    John Ashraf PCP - General Internal Medicine  3/13/17     Phone: [phone] Fax: [phone]         [facility] [address] [address]            Transition of Care Review                                                      Did you have a surgery or procedure during your hospital visit? Yes   If yes, do you have any of the following:     Signs of infection:  No    Pain:  Yes     Pain Scale (0-10) 2/10     Location: Surg site    Wound/incision concerns? NO    Do you have all of your medications/refills?  Yes    Are you having any side effects or questions about your medication(s)? No    Do you have any new or worsening symptoms?  No    Do you have any future appointments scheduled?   Yes    3/24             Plan                           "                            Thanks for your time.  Your Care Coordinator may follow-up within the next couple days.  In the meantime if you have questions, concerns or problems call your care team.        Dottie Marin

## 2017-03-23 LAB — COPATH REPORT: NORMAL

## 2017-03-23 NOTE — TELEPHONE ENCOUNTER
Head & Neck Tumor Conference Note  03/23/2017    Status: Return  Staff: Dr. Wilson    Tumor Site: Right parapharyngeal space pleomorphic adenoma  Tumor Stage: N/A    Brief History:  Mr. Josafat Elder is a 46 year old male presented with right facial swelling, right ear effusion, right throat fullness, found to have a 6.2 cm right parapharyngeal space mass. He underwent right parotidectomy, MRND levels 1b-3 and removal of right parapharyngeal space mass    Reason for Review:   Review pathology, discuss plan of care.    Imaging:  None new    Pathology:  3/16/2016 Surgical pathology  FINAL DIAGNOSIS:   A. RIGHT SUPERFICIAL PAROTIDECTOMY:   - Salivary gland tissue with no histopathologic abnormality.   - One benign lymph node.     B. RIGHT LEVEL 2B NECK DISSECTION:   - Four benign lymph nodes.     C. RIGHT LEVEL 2A, 3 NECK DISSECTION:   - Twelve benign lymph nodes.     D. SUBMANDIBULAR GLAND, RIGHT:   - Salivary gland tissue with no histopathologic abnormality.     E. RIGHT ADDITIONAL PAROTID OVER FACIAL NERVE:   - Salivary gland tissue with no histopathologic abnormality.     F. RIGHT DEEP LOBE PAROTID, RIGHT PARAPHARYNGEAL SPACE MASS:   - Pleomorphic adenoma, 6.1 cm, with negative surgical margins, narrowly   excised (less than 1 mm to inked margin).   - Negative for malignancy.     Tumor Board Recommendations:  Path reviewed, consistent with pleomorphic adenoma, no malignancy. Recommend routine follow up.   Dr. Wilson reviewed Farrukh's pathology with him today in clinic  Routine follow up recommended

## 2017-03-24 ENCOUNTER — OFFICE VISIT (OUTPATIENT)
Dept: OTOLARYNGOLOGY | Facility: CLINIC | Age: 47
End: 2017-03-24

## 2017-03-24 ENCOUNTER — TEAM CONFERENCE (OUTPATIENT)
Dept: OTOLARYNGOLOGY | Facility: CLINIC | Age: 47
End: 2017-03-24
Attending: OTOLARYNGOLOGY

## 2017-03-24 ENCOUNTER — THERAPY VISIT (OUTPATIENT)
Dept: SPEECH THERAPY | Facility: CLINIC | Age: 47
End: 2017-03-24

## 2017-03-24 VITALS
BODY MASS INDEX: 25.48 KG/M2 | DIASTOLIC BLOOD PRESSURE: 82 MMHG | WEIGHT: 178 LBS | TEMPERATURE: 99 F | HEIGHT: 70 IN | SYSTOLIC BLOOD PRESSURE: 124 MMHG | HEART RATE: 85 BPM | OXYGEN SATURATION: 95 %

## 2017-03-24 DIAGNOSIS — R25.2 TRISMUS: Primary | ICD-10-CM

## 2017-03-24 DIAGNOSIS — R22.1 PARAPHARYNGEAL SPACE MASS: ICD-10-CM

## 2017-03-24 DIAGNOSIS — D11.0 PLEOMORPHIC ADENOMA OF PAROTID GLAND: Primary | ICD-10-CM

## 2017-03-24 ASSESSMENT — PAIN SCALES - GENERAL: PAINLEVEL: MILD PAIN (3)

## 2017-03-24 NOTE — PROGRESS NOTES
CLINICAL SWALLOW AND TRISMUS EVALUATION       03/24/17 1400   General Information   Type Of Visit Initial   Start Of Care Date 03/24/17   Referring Physician Dr. Sarita Wilson   Orders Evaluate And Treat   Orders Comment Trismus   Medical Diagnosis Right parapharyngeal space pleomorphic adenoma   Onset Of Illness/injury Or Date Of Surgery 03/16/17   Hearing WFL   Pertinent History of Current Problem/OT: Additional Occupational Profile Info PT's PMH is significant for Right parapharyngeal space pleomorphic adenoma for which he underwent total parotidectomy, neck dissection, parapharyngeal space mass removal, Direct Laryngoscopy on 3/16/17.  He is having difficulty opening his mouth since the time of his surgery.   Respiratory Status Room air   Prior Level Of Function Swallowing   Prior Level Of Function Comment Regular diet, small bites   Patient Role/employment History Employed   Living Environment [place]   General Observations PT pleasant, cooperative.   Patient/family Goals To improve jaw opening.   FALL RISK SCREEN   Have you fallen 2 or more times in the last year? No   Have you fallen and had an injury in the past year? No   Is the patient a fall risk? No   Clinical Swallow Evaluation   Oral Musculature anomalies present   Structural Abnormalities none present   Dentition present and adequate   Mucosal Quality good   Mandibular Strength and Mobility impaired  (Jaw ROM measured to be  24 mm incisal today.)   Oral Labial Strength and Mobility impaired pursing;impaired retraction   Lingual Strength and Mobility WFL   Velar Elevation intact   Buccal Strength and Mobility impaired   Laryngeal Function Cough;Swallow;Throat clear;Voicing initiated;Dry swallow palpated   Additional Documentation Yes   Clinical Swallow Eval: Thin Liquid Texture Trial   Mode of Presentation, Thin Liquids cup;self-fed   Volume of Liquid or Food Presented 2 oz   Oral Phase of Swallow WFL   Pharyngeal Phase of Swallow intact    Diagnostic Statement No overt Sx of aspiration.   Swallow Eval: Clinical Impressions   Skilled Criteria for Therapy Intervention Skilled criteria met.  Treatment indicated.   Functional Assessment Scale (FAS) 3   Dysphagia Outcome Severity Scale (MARVEL) Level 3 - MARVEL   Treatment Diagnosis Moderate trismus   Diet texture recommendations Regular diet;Thin liquids   Rehab Potential good, to achieve stated therapy goals   Therapy Frequency other (see comments)   Predicted Duration of Therapy Intervention (days/wks) 2x/month x 3 mos   Anticipated Discharge Disposition home w/ outpatient services   Risks and Benefits of Treatment have been explained. Yes   Patient, family and/or staff in agreement with Plan of Care Yes   Clinical Impression Comments PT presents with moderate trismus as a result of surgery.  He reports discomfort with chewing and mouth opening.  Recommend Therabite protocol for gentle improvement of jaw ROM.     Swallow Goals   SLP Swallow Goals 1   Swallow Goal 1   Goal Identifier Jaw   Goal Description 1. PT will improve jaw ROM to 40 mm with 7-7-7 and 5-5-30 Therabite protocols, as prescribed.     Target Date 06/22/17   Total Session Time   Total Session Time 25   Total Evaluation Time 10

## 2017-03-24 NOTE — MR AVS SNAPSHOT
After Visit Summary   3/24/2017    Josafat Elder    MRN: [id number]           Patient Information     Date Of Birth          1970        Visit Information        Provider Department      3/24/2017 2:20 PM Sarita Wilson MD [facility] Ear Nose and Throat        Care Instructions    Follow up with Dr. Wilson in 6-8 weeks   Call me if ?'s arise [phone]  Aleshia Osborn RN        Follow-ups after your visit        Your next 10 appointments already scheduled     May 31, 2017  2:00 PM CDT   (Arrive by 1:45 PM)   RETURN TUMOR VISIT with MD STACIA Peters [facility] Nose [facility] Th[facility]at ([facility])    [address]  [address] [facility] [address] [phone]              Who to contact     Please call your clinic at [phone] to:    Ask questions about your health    Make or cancel appointments    Discuss your medicines    Learn about your test results    Speak to your doctor   If you have compliments or concerns about an experience at your clinic, or if you wish to file a complaint, please contact [facility] Patient Relations at [phone] or email us at [email or web address]         Additional Information About Your Visit        MyChart Information     [employer] is an electronic gateway that provides easy, online access to your medical records. With [employer], you can request a clinic appointment, read your test results, renew a prescription or communicate with your care team.     To sign up for [employer] visit the website at [email or web address]   You will be asked to enter the access code listed below, as well as some personal information. Please follow the directions to create your username and password.     Your access code is: [email or web address]  Expires: 5/15/2017  3:10 PM     Your access code will  in 90 days. If you need help or a new code, please contact your [facility] Clinic or call  "[phone] for assistance.        Care EveryWhere ID     This is your Care EveryWhere ID. This could be used by other organizations to access your [facility] medical records  [id number]        Your Vitals Were     Pulse Temperature Height Pulse Oximetry BMI (Body Mass Index)       85 99  F (37.2  C) 1.778 m (5' 10\") 95% 25.54 kg/m2        Blood Pressure from Last 3 Encounters:   03/24/17 124/82   03/19/17 (!) 155/106   02/22/17 134/90    Weight from Last 3 Encounters:   03/24/17 80.7 kg (178 lb)   03/16/17 83 kg (182 lb 15.7 oz)   02/22/17 83 kg (183 lb)              Today, you had the following     No orders found for display       Primary Care Provider Office Phone # Fax #    John Ashraf [phone]       [facility] [address] [address]        Thank you!     Thank you for choosing [facility] EAR NOSE AND THROAT  for your care. Our goal is always to provide you with excellent care. Hearing back from our patients is one way we can continue to improve our services. Please take a few minutes to complete the written survey that you may receive in the mail after your visit with us. Thank you!             Your Updated Medication List - Protect others around you: Learn how to safely use, store and throw away your medicines at www.disposemymeds.org.          This list is accurate as of: 3/24/17  2:34 PM.  Always use your most recent med list.                   Brand Name Dispense Instructions for use    carboxymethylcellulose 1 % ophthalmic solution    CELLUVISC/REFRESH LIQUIGEL    1 Bottle    Place 2 drops into the right eye every 2 hours as needed for dry eyes       mineral oil-hydrophilic petrolatum     50 g    Apply 1 g topically every 8 hours To neck incision       Multi-vitamin Tabs tablet      Take 1 tablet by mouth daily       oxyCODONE 5 MG IR tablet    ROXICODONE    40 tablet    Take 1-2 tablets (5-10 mg) by mouth every 3 hours as needed for moderate to severe pain       " senna-docusate 8.6-50 MG per tablet    SENOKOT-S;PERICOLACE    20 tablet    Take 1-2 tablets by mouth 2 times daily

## 2017-03-24 NOTE — LETTER
3/24/2017     RE: Josafat Elder  [address]  [facility] [address]     Dear Colleague,    Thank you for referring your patient, Josafat Elder, to the [facility] EAR NOSE AND TH[facility]AT at [facility]. Please see a copy of my visit note below.    Dear Dr. Pinzon:    I had the pleasure of meeting Josafat Elder in follow-up today at the [facility].     History of Present Illness:   Mr Elder is a 46 year old man who was referred for evaluation of a parapharyngeal space mass. He has a 4-5 year history of progressive right sided facial swelling. He was seen in the local ER in January 2017 with right ear pain, decreased hearing and was found to have a right sided effusion. At that time he was noted to have right sided soft palate swelling and uvula deviation. He was again seen in the ER at the beginning of February with continued ear symptoms. A CT scan was obtained which showed a 6.2 cm parapharyngeal space mass. He was referred to Dr Pinzon. He had an MRI which was consistent with a large right sided parapharyngeal space mass, likely a pleomorphic adenoma.     He was taken to the OR on 3/16/2017 for a transparotid-transcervical excision of the parapharyngeal space mass with a total parotidectomy and neck dissection. The mass was completely excised without spillage. Pathology was consistent with a pleomorphic adenoma. He was admitted postoperatively and discharged on POD 2. He comes in for postop. He stopped taking pain medications on Tuesday. His shoulder is tight on the right side. He still has some lip weakness. He is eating what he wants. He does have some difficulty with mouth opening. He had his RUTH drain removed by Dr Pinzon yesterday.    He is otherwise healthy.    No family history of H&N cancers    MEDICATIONS:     Current Outpatient Prescriptions   Medication Sig Dispense Refill     carboxymethylcellulose  "(CELLUVISC/REFRESH LIQUIGEL) 1 % ophthalmic solution Place 2 drops into the right eye every 2 hours as needed for dry eyes 1 Bottle 0     mineral oil-hydrophilic petrolatum (AQUAPHOR) Apply 1 g topically every 8 hours To neck incision 50 g 0     senna-docusate (SENOKOT-S;PERICOLACE) 8.6-50 MG per tablet Take 1-2 tablets by mouth 2 times daily 20 tablet 0     oxyCODONE (ROXICODONE) 5 MG IR tablet Take 1-2 tablets (5-10 mg) by mouth every 3 hours as needed for moderate to severe pain 40 tablet 0     multivitamin, therapeutic with minerals (MULTI-VITAMIN) TABS tablet Take 1 tablet by mouth daily         ALLERGIES:  No Known Allergies    HABITS/SOCIAL HISTORY:   Drinks alcohol 6 pack beer/day  Smoked since 1985, previously 4 ppd, now few cigarettes per day  Works as a , fishes in the winter    PAST MEDICAL HISTORY:   Past Medical History:   Diagnosis Date     Pleomorphic adenoma         FAMILY HISTORY:  No family history on file.    REVIEW OF SYSTEMS:  12 point ROS was negative other than the symptoms noted above in the HPI.  Patient Supplied Answers to Review of Systems  No flowsheet data found.      PHYSICAL EXAMINATION:   /82  Pulse 85  Temp 99  F (37.2  C)  Ht 1.778 m (5' 10\")  Wt 80.7 kg (178 lb)  SpO2 95%  BMI 25.54 kg/m2   Patient in NAD  Righ neck and parotid incision well approximated with prolene sutures, tender to palpation, no obvious fluid collection, no drainage  Right shoulder with slight decreased ROM  Right facial nerve II/IV (marginal branch weak but moves)  Palatal fullness resolved  Trismus       RESULTS REVIEWED:     SPECIMEN(S):   A: Right superficial parotidectomy   B: Right level 2B neck dissection   C: Right level 2A, 3 neck dissection   D: Submandibular gland, right   E: Right additional parotid over facial nerve   F: Right deep lobe parotid, right parapharyngeal space mass     FINAL DIAGNOSIS:   A. RIGHT SUPERFICIAL PAROTIDECTOMY:   - Salivary gland tissue with " no histopathologic abnormality.   - One benign lymph node.     B. RIGHT LEVEL 2B NECK DISSECTION:   - Four benign lymph nodes.     C. RIGHT LEVEL 2A, 3 NECK DISSECTION:   - Twelve benign lymph nodes.     D. SUBMANDIBULAR GLAND, RIGHT:   - Salivary gland tissue with no histopathologic abnormality.     E. RIGHT ADDITIONAL PAROTID OVER FACIAL NERVE:   - Salivary gland tissue with no histopathologic abnormality.     F. RIGHT DEEP LOBE PAROTID, RIGHT PARAPHARYNGEAL SPACE MASS:   - Pleomorphic adenoma, 6.1 cm, with negative surgical margins, narrowly   excised (less than 1 mm to inked margin).   - Negative for malignancy.       IMPRESSION AND PLAN:   Mr Elder is a 46 year old man with a large right parapharyngeal space mass consistent with pleomorphic adenoma on pathology. This was excised using a transparotid-trancervical approach without tumor spillage. He is overall doing well, with some expected weakness of his spinal accessory and facial nerves. We removed his sutures today and discussed pathology. We will have him see speech today for a therabite for his trismus. We discussed exercises for his shoulder which he was educated on during his inpatient stay. I will plan on seeing him back in about a month.    Thank you very much for the opportunity to participate in the care of your patient.      Sarita Wilson M.D.  Otolaryngology- Head & Neck Surgery    CC:  Joseph Pinzon MD, [facility], [facility] Medical Office [address], [address]  [place] [id number]

## 2017-03-24 NOTE — PROGRESS NOTES
Dear Dr. Pinzon:    I had the pleasure of meeting Josafat Elder in follow-up today at the [facility].     History of Present Illness:   Mr Elder is a 46 year old man who was referred for evaluation of a parapharyngeal space mass. He has a 4-5 year history of progressive right sided facial swelling. He was seen in the local ER in January 2017 with right ear pain, decreased hearing and was found to have a right sided effusion. At that time he was noted to have right sided soft palate swelling and uvula deviation. He was again seen in the ER at the beginning of February with continued ear symptoms. A CT scan was obtained which showed a 6.2 cm parapharyngeal space mass. He was referred to Dr Pinzon. He had an MRI which was consistent with a large right sided parapharyngeal space mass, likely a pleomorphic adenoma.     He was taken to the OR on 3/16/2017 for a transparotid-transcervical excision of the parapharyngeal space mass with a total parotidectomy and neck dissection. The mass was completely excised without spillage. Pathology was consistent with a pleomorphic adenoma. He was admitted postoperatively and discharged on POD 2. He comes in for postop. He stopped taking pain medications on Tuesday. His shoulder is tight on the right side. He still has some lip weakness. He is eating what he wants. He does have some difficulty with mouth opening. He had his RUTH drain removed by Dr Pinzon yesterday.    He is otherwise healthy.    No family history of H&N cancers    MEDICATIONS:     Current Outpatient Prescriptions   Medication Sig Dispense Refill     carboxymethylcellulose (CELLUVISC/REFRESH LIQUIGEL) 1 % ophthalmic solution Place 2 drops into the right eye every 2 hours as needed for dry eyes 1 Bottle 0     mineral oil-hydrophilic petrolatum (AQUAPHOR) Apply 1 g topically every 8 hours To neck incision 50 g 0     senna-docusate (SENOKOT-S;PERICOLACE) 8.6-50 MG per tablet  "Take 1-2 tablets by mouth 2 times daily 20 tablet 0     oxyCODONE (ROXICODONE) 5 MG IR tablet Take 1-2 tablets (5-10 mg) by mouth every 3 hours as needed for moderate to severe pain 40 tablet 0     multivitamin, therapeutic with minerals (MULTI-VITAMIN) TABS tablet Take 1 tablet by mouth daily         ALLERGIES:  No Known Allergies    HABITS/SOCIAL HISTORY:   Drinks alcohol 6 pack beer/day  Smoked since 1985, previously 4 ppd, now few cigarettes per day  Works as a , fishes in the winter    PAST MEDICAL HISTORY:   Past Medical History:   Diagnosis Date     Pleomorphic adenoma         FAMILY HISTORY:  No family history on file.    REVIEW OF SYSTEMS:  12 point ROS was negative other than the symptoms noted above in the HPI.  Patient Supplied Answers to Review of Systems  No flowsheet data found.      PHYSICAL EXAMINATION:   /82  Pulse 85  Temp 99  F (37.2  C)  Ht 1.778 m (5' 10\")  Wt 80.7 kg (178 lb)  SpO2 95%  BMI 25.54 kg/m2   Patient in NAD  Righ neck and parotid incision well approximated with prolene sutures, tender to palpation, no obvious fluid collection, no drainage  Right shoulder with slight decreased ROM  Right facial nerve II/IV (marginal branch weak but moves)  Palatal fullness resolved  Trismus       RESULTS REVIEWED:     SPECIMEN(S):   A: Right superficial parotidectomy   B: Right level 2B neck dissection   C: Right level 2A, 3 neck dissection   D: Submandibular gland, right   E: Right additional parotid over facial nerve   F: Right deep lobe parotid, right parapharyngeal space mass     FINAL DIAGNOSIS:   A. RIGHT SUPERFICIAL PAROTIDECTOMY:   - Salivary gland tissue with no histopathologic abnormality.   - One benign lymph node.     B. RIGHT LEVEL 2B NECK DISSECTION:   - Four benign lymph nodes.     C. RIGHT LEVEL 2A, 3 NECK DISSECTION:   - Twelve benign lymph nodes.     D. SUBMANDIBULAR GLAND, RIGHT:   - Salivary gland tissue with no histopathologic abnormality.     E. " RIGHT ADDITIONAL PAROTID OVER FACIAL NERVE:   - Salivary gland tissue with no histopathologic abnormality.     F. RIGHT DEEP LOBE PAROTID, RIGHT PARAPHARYNGEAL SPACE MASS:   - Pleomorphic adenoma, 6.1 cm, with negative surgical margins, narrowly   excised (less than 1 mm to inked margin).   - Negative for malignancy.       IMPRESSION AND PLAN:   Mr Elder is a 46 year old man with a large right parapharyngeal space mass consistent with pleomorphic adenoma on pathology. This was excised using a transparotid-trancervical approach without tumor spillage. He is overall doing well, with some expected weakness of his spinal accessory and facial nerves. We removed his sutures today and discussed pathology. We will have him see speech today for a therabite for his trismus. We discussed exercises for his shoulder which he was educated on during his inpatient stay. I will plan on seeing him back in about a month.    Thank you very much for the opportunity to participate in the care of your patient.      Sarita Wilson M.D.  Otolaryngology- Head & Neck Surgery          CC:  Joseph Pinzon MD, [facility], [facility] Medical Office [address], [address]  [place] [address]

## 2017-03-24 NOTE — MR AVS SNAPSHOT
After Visit Summary   3/24/2017    Josafat Elder    MRN: [id number]           Patient Information     Date Of Birth          1970        Visit Information        Provider Department      3/24/2017 2:20 PM Ayla Reyes SLP M [facility]        Today's Diagnoses     Trismus    -  1    Parapharyngeal space mass           Follow-ups after your visit        Additional Services     SPEECH THERAPY REFERRAL       *This therapy referral will be filtered to a centralized scheduling office at [facility] and the patient will receive a call to schedule an appointment at a [facility] location most convenient for them. *     [facility] provides Speech Therapy evaluation and treatment and many specialty services across the [facility] system.  If requesting a specialty program, please choose from the list below.  If you have not heard from the scheduling office within 2 business days, please call [phone] for all locations, with the exception of [place], please call [phone].       Treatment: Evaluation & Treatment  Speech Treatment Diagnosis: Dysphagia, trismus  Special Instructions: Trismus, Therabite.  Does not need to be scheduled.     Special Programs: Clinical Swallow Study    Please be aware that coverage of these services is subject to the terms and limitations of your health insurance plan.  Call member services at your health plan with any benefit or coverage questions.      **Note to Provider:  If you are referring outside of [facility] for the therapy appointment, please list the name of the location in the  special instructions  above, print the referral and give to the patient to schedule the appointment.                  Your next 10 appointments already scheduled     May 31, 2017  2:00 PM CDT   (Arrive by 1:45 PM)   RETURN TUMOR VISIT with Sarita Wilson MD   [facility] N[facility] Th[facility] [place])    [address] [place] [address]  Se  4th Floor  [facility] [address] [phone]              Who to contact     Please call your clinic at [phone] to:    Ask questions about your health    Make or cancel appointments    Discuss your medicines    Learn about your test results    Speak to your doctor   If you have compliments or concerns about an experience at your clinic, or if you wish to file a complaint, please contact [facility] Patient Relations at [phone] or email us at [email or web address]         Additional Information About Your Visit        COZero Information     COZero is an electronic gateway that provides easy, online access to your medical records. With COZero, you can request a clinic appointment, read your test results, renew a prescription or communicate with your care team.     To sign up for [employer]Zero visit the website at [email or web address]   You will be asked to enter the access code listed below, as well as some personal information. Please follow the directions to create your username and password.     Your access code is: [email or web address]  Expires: 5/15/2017  3:10 PM     Your access code will  in 90 days. If you need help or a new code, please contact your [facility] or call [phone] for assistance.        Care EveryWhere ID     This is your Care EveryWhere ID. This could be used by other organizations to access your [place] medical records  [id number]         Blood Pressure from Last 3 Encounters:   17 124/82   17 (!) 155/106   17 134/90    Weight from Last 3 Encounters:   17 80.7 kg (178 lb)   17 83 kg (182 lb 15.7 oz)   17 83 kg (183 lb)              We Performed the Following     SPEECH THERAPY REFERRAL        Primary Care Provider Office Phone # Fax #    John Ashraf [phone]       [facility] INTERNAL [facility] [address] [facility] [address]        Thank you!      Thank you for choosing [facility]  for your care. Our goal is always to provide you with excellent care. Hearing back from our patients is one way we can continue to improve our services. Please take a few minutes to complete the written survey that you may receive in the mail after your visit with us. Thank you!             Your Updated Medication List - Protect others around you: Learn how to safely use, store and throw away your medicines at www.disposemymeds.org.          This list is accurate as of: 3/24/17  3:03 PM.  Always use your most recent med list.                   Brand Name Dispense Instructions for use    carboxymethylcellulose 1 % ophthalmic solution    CELLUVISC/REFRESH LIQUIGEL    1 Bottle    Place 2 drops into the right eye every 2 hours as needed for dry eyes       mineral oil-hydrophilic petrolatum     50 g    Apply 1 g topically every 8 hours To neck incision       Multi-vitamin Tabs tablet      Take 1 tablet by mouth daily       oxyCODONE 5 MG IR tablet    ROXICODONE    40 tablet    Take 1-2 tablets (5-10 mg) by mouth every 3 hours as needed for moderate to severe pain       senna-docusate 8.6-50 MG per tablet    SENOKOT-S;PERICOLACE    20 tablet    Take 1-2 tablets by mouth 2 times daily

## 2017-05-31 ENCOUNTER — OFFICE VISIT (OUTPATIENT)
Dept: OTOLARYNGOLOGY | Facility: CLINIC | Age: 47
End: 2017-05-31

## 2017-05-31 DIAGNOSIS — D11.0 PLEOMORPHIC ADENOMA OF PAROTID GLAND: Primary | ICD-10-CM

## 2017-05-31 ASSESSMENT — PAIN SCALES - GENERAL: PAINLEVEL: MILD PAIN (3)

## 2017-05-31 NOTE — LETTER
5/31/2017       RE: Josafat Elder  [address]  [facility] [address]     Dear Colleague,    Thank you for referring your patient, Josafat Elder, to the [facility] E[facility] NOSE [facility]AT at [facility]. Please see a copy of my visit note below.    Dear Dr. Pinzon:    I had the pleasure of meeting Josafta Elder in follow-up today at the [facility].     History of Present Illness:   Mr Elder is a 46 year old man who was referred for evaluation of a parapharyngeal space mass. He had a 4-5 year history of progressive right sided facial swelling. He was seen in the local ER in January 2017 with right ear pain, decreased hearing and was found to have a right sided effusion. At that time he was noted to have right sided soft palate swelling and uvula deviation. He was again seen in the ER at the beginning of February with continued ear symptoms. A CT scan showed a 6.2 cm parapharyngeal space mass. He was referred to Dr Pinzon. He had an MRI which was consistent with a large right sided parapharyngeal space mass, likely a pleomorphic adenoma.     He was taken to the OR on 3/16/2017 for a transparotid-transcervical excision of the parapharyngeal space mass with a total parotidectomy and neck dissection. The mass was completely excised without spillage. Pathology was consistent with a pleomorphic adenoma. He comes in today for routine follow-up. He has some intermittent shooting pains in his neck that does not require pain medications. He has some improving lip weakness. He has no difficulty with eating. He continues to smoke, but has cut down.      MEDICATIONS:     Current Outpatient Prescriptions   Medication Sig Dispense Refill     carboxymethylcellulose (CELLUVISC/REFRESH LIQUIGEL) 1 % ophthalmic solution Place 2 drops into the right eye every 2 hours as needed for dry eyes 1 Bottle 0     multivitamin, therapeutic with minerals  (MULTI-VITAMIN) TABS tablet Take 1 tablet by mouth daily         ALLERGIES:  No Known Allergies    HABITS/SOCIAL HISTORY:   Drinks alcohol 6 pack beer/day  Smoked since 1985, previously 4 ppd, now few cigarettes per day  Works as a , fishes in the winter    PAST MEDICAL HISTORY:   Past Medical History:   Diagnosis Date     Pleomorphic adenoma         FAMILY HISTORY:  No family history on file.    REVIEW OF SYSTEMS:  12 point ROS was negative other than the symptoms noted above in the HPI.  Patient Supplied Answers to Review of Systems  [facility] ROS 5/31/2017   Neurology Numbness         PHYSICAL EXAMINATION:   There were no vitals taken for this visit.   Appearance:   normal; NAD, age-appropriate appearance, well-developed, normal habitus   Communication:   normal; communicates verbally, normal voice quality   Head/Face:   inspection -  Normal; no scars or visible lesions   Salivary glands -  S/p right parotidectomy with well healed modified Cheko incision, no palpable masses, mild tenderness over masseter   Facial strength -  Mild right marginal mandibular nerve weakness, improved from previous   Skin:  normal, no rash   Ocular Motility:  normal occular movements   Ears:  auricle (AD) -  normal  EAC (AD) -  normal  TM (AD) -  Normal, no effusion  auricle (AS) -  normal  EAC (AS) -  normal  TM (AS) -  Normal, no effusion  Normal clinical speech reception   Nose:  Ext. inspection -  Normal   Oral Cavity:  lips -  Normal mucosa, oral competence, and stoma size   Few missing teeth, healthy gingival mucosa   Hard palate, buccal, floor of mouth mucosa normal   Tongue - normal movement   Oropharynx:  mucosa -  Normal, no lesions  soft palate -  Previous right palate fullness resolved, no palpable masses   Neck: Well healed neck incision, no palpable masses   Lymphatic:  no abnormal nodes   Cardiovascular:  warm, pink, well-perfused extremities without swelling, tenderness, or edema   Respiratory:  Normal  respiratory effort, no stridor   Neuro/Psych.:  mood/affect -  normal  mental status -  normal  cranial nerves -  normal          IMPRESSION AND PLAN:   Mr Elder is a 46 year old man s/p transparotid-trancervical approach for excision of right parapharyngeal space pleomorphic adenoma. His facial nerve weakness is improved from previous with just mild asymmetry of the marginal mandibular nerve. He has some continued discomfort, not consistent with first bite syndrome, but sounds more neuropathic. I discussed the possibility of a trial of gabapentin but the patient is not interested in taking any medication. I will plan on seeing him back in about 3 months.     Thank you very much for the opportunity to participate in the care of your patient.      Sarita Wilson M.D.  Otolaryngology- Head & Neck Surgery    CC:  Joseph Pinzon MD, [facility], [facility] Medical Office [address], [address] [place], [place] [id number]

## 2017-05-31 NOTE — MR AVS SNAPSHOT
After Visit Summary   2017    Josafat Elder    MRN: [id number]           Patient Information     Date Of Birth          1970        Visit Information        Provider Department      2017 2:00 PM Sarita Wilson MD [facility]        Today's Diagnoses     Pleomorphic adenoma of parotid gland    -  1      Care Instructions    Follow up with Dr. Wilson in October  Call me if ?'s arise [phone]  Aleshia Osborn RN          Follow-ups after your visit        Who to contact     Please call your clinic at [phone] to:    Ask questions about your health    Make or cancel appointments    Discuss your medicines    Learn about your test results    Speak to your doctor   If you have compliments or concerns about an experience at your clinic, or if you wish to file a complaint, please contact [facility] Patient Relations at [phone] or email us at [email or web address]         Additional Information About Your Visit        MyChart Information     [email or web address] is an electronic gateway that provides easy, online access to your medical records. With [email or web address], you can request a clinic appointment, read your test results, renew a prescription or communicate with your care team.     To sign up for [email or web address] visit the website at [email or web address]   You will be asked to enter the access code listed below, as well as some personal information. Please follow the directions to create your username and password.     Your access code is: [email or web address]  Expires: 8/15/2017  6:30 AM     Your access code will  in 90 days. If you need help or a new code, please contact your [facility] or call [phone] for assistance.        Care EveryWhere ID     This is your Care EveryWhere ID. This could be used by other organizations to access your [facility] medical records  [id number]         Blood Pressure from Last 3  Encounters:   03/24/17 124/82   03/19/17 (!) 155/106   02/22/17 134/90    Weight from Last 3 Encounters:   03/24/17 80.7 kg (178 lb)   03/16/17 83 kg (182 lb 15.7 oz)   02/22/17 83 kg (183 lb)              Today, you had the following     No orders found for display       Primary Care Provider Office Phone # Fax #    John Ashraf [phone]       [facility] [address] [facility] [address]        Thank you!     Thank you for choosing [facility] EAR NOSE AND THROAT  for your care. Our goal is always to provide you with excellent care. Hearing back from our patients is one way we can continue to improve our services. Please take a few minutes to complete the written survey that you may receive in the mail after your visit with us. Thank you!             Your Updated Medication List - Protect others around you: Learn how to safely use, store and throw away your medicines at www.disposemymeds.org.          This list is accurate as of: 5/31/17 11:59 PM.  Always use your most recent med list.                   Brand Name Dispense Instructions for use    carboxymethylcellulose 1 % ophthalmic solution    CELLUVISC/REFRESH LIQUIGEL    1 Bottle    Place 2 drops into the right eye every 2 hours as needed for dry eyes       Multi-vitamin Tabs tablet      Take 1 tablet by mouth daily

## 2017-05-31 NOTE — PROGRESS NOTES
Dear Dr. Pinzon:    I had the pleasure of meeting Josafat Elder in follow-up today at the [facility].     History of Present Illness:   Mr Elder is a 46 year old man who was referred for evaluation of a parapharyngeal space mass. He had a 4-5 year history of progressive right sided facial swelling. He was seen in the local ER in January 2017 with right ear pain, decreased hearing and was found to have a right sided effusion. At that time he was noted to have right sided soft palate swelling and uvula deviation. He was again seen in the ER at the beginning of February with continued ear symptoms. A CT scan showed a 6.2 cm parapharyngeal space mass. He was referred to Dr Pinzon. He had an MRI which was consistent with a large right sided parapharyngeal space mass, likely a pleomorphic adenoma.     He was taken to the OR on 3/16/2017 for a transparotid-transcervical excision of the parapharyngeal space mass with a total parotidectomy and neck dissection. The mass was completely excised without spillage. Pathology was consistent with a pleomorphic adenoma. He comes in today for routine follow-up. He has some intermittent shooting pains in his neck that does not require pain medications. He has some improving lip weakness. He has no difficulty with eating. He continues to smoke, but has cut down.      MEDICATIONS:     Current Outpatient Prescriptions   Medication Sig Dispense Refill     carboxymethylcellulose (CELLUVISC/REFRESH LIQUIGEL) 1 % ophthalmic solution Place 2 drops into the right eye every 2 hours as needed for dry eyes 1 Bottle 0     multivitamin, therapeutic with minerals (MULTI-VITAMIN) TABS tablet Take 1 tablet by mouth daily         ALLERGIES:  No Known Allergies    HABITS/SOCIAL HISTORY:   Drinks alcohol 6 pack beer/day  Smoked since 1985, previously 4 ppd, now few cigarettes per day  Works as a , fishes in the winter    PAST MEDICAL HISTORY:    Past Medical History:   Diagnosis Date     Pleomorphic adenoma         FAMILY HISTORY:  No family history on file.    REVIEW OF SYSTEMS:  12 point ROS was negative other than the symptoms noted above in the HPI.  Patient Supplied Answers to Review of Systems  [facility] ROS 5/31/2017   Neurology Numbness         PHYSICAL EXAMINATION:   There were no vitals taken for this visit.   Appearance:   normal; NAD, age-appropriate appearance, well-developed, normal habitus   Communication:   normal; communicates verbally, normal voice quality   Head/Face:   inspection -  Normal; no scars or visible lesions   Salivary glands -  S/p right parotidectomy with well healed modified Cheko incision, no palpable masses, mild tenderness over masseter   Facial strength -  Mild right marginal mandibular nerve weakness, improved from previous   Skin:  normal, no rash   Ocular Motility:  normal occular movements   Ears:  auricle (AD) -  normal  EAC (AD) -  normal  TM (AD) -  Normal, no effusion  auricle (AS) -  normal  EAC (AS) -  normal  TM (AS) -  Normal, no effusion  Normal clinical speech reception   Nose:  Ext. inspection -  Normal   Oral Cavity:  lips -  Normal mucosa, oral competence, and stoma size   Few missing teeth, healthy gingival mucosa   Hard palate, buccal, floor of mouth mucosa normal   Tongue - normal movement   Oropharynx:  mucosa -  Normal, no lesions  soft palate -  Previous right palate fullness resolved, no palpable masses   Neck: Well healed neck incision, no palpable masses   Lymphatic:  no abnormal nodes   Cardiovascular:  warm, pink, well-perfused extremities without swelling, tenderness, or edema   Respiratory:  Normal respiratory effort, no stridor   Neuro/Psych.:  mood/affect -  normal  mental status -  normal  cranial nerves -  normal          IMPRESSION AND PLAN:   Mr Elder is a 46 year old man s/p transparotid-trancervical approach for excision of right parapharyngeal space pleomorphic adenoma. His facial  nerve weakness is improved from previous with just mild asymmetry of the marginal mandibular nerve. He has some continued discomfort, not consistent with first bite syndrome, but sounds more neuropathic. I discussed the possibility of a trial of gabapentin but the patient is not interested in taking any medication. I will plan on seeing him back in about 3 months.     Thank you very much for the opportunity to participate in the care of your patient.      Sarita Wilson M.D.  Otolaryngology- Head & Neck Surgery          CC:  Joseph Pinzon MD, [facility], [facility] Medical Office [address], [address]  [place] [id number]

## 2017-09-21 ENCOUNTER — CARE COORDINATION (OUTPATIENT)
Dept: OTOLARYNGOLOGY | Facility: CLINIC | Age: 47
End: 2017-09-21

## 2017-09-21 NOTE — PROGRESS NOTES
Farrukh called stating it is a hardship for him to travel here for appointments.  I spoke with Dr. Wilson and she stated he could follow up with Dr. Pinzon now.  Farrukh was notified and he will f/u closer to home

## 2020-01-16 NOTE — PLAN OF CARE
Problem: Goal Outcome Summary  Goal: Goal Outcome Summary  Outcome: Improving  POD#2 s/p total parotidectomy, R neck dissection, and parapharyngeal space mass removal. VSS. A&Ox4. Neuros intact ex numbness to R side of face; pt states this is improving. 2 RUTH's in neck to bulb suction with moderate amount of serosanguinous drainage (25 mL and 22 mL at 1500). Neck incision closed w/sutures, SAROJ, aquaphor applied per order. Pt c/o pain in R side of face and neck, PRN oxycodone given x4. MSSA scores of 5, 4 and 3; no intervention needed. Tremor improving, currently not visible, but felt upon exam.Tolerating a reg diet. Void spont, no BM this shift. PIV removed dt infiltration, per MD ok to have no access. Continue to monitor and follow POC.        Spoke with patient and relayed results, no further questions asked

## (undated) DEVICE — VESSEL LOOPS BLUE SUPERMAXI 011022PBX

## (undated) DEVICE — SPONGE KITTNER 30-101

## (undated) DEVICE — CATH TRAY FOLEY SURESTEP 16FR W/TMP PRB STLK LATEX A319416AM

## (undated) DEVICE — DRAIN JACKSON PRATT 10MM FLAT 4/4 PERF SU130-1311

## (undated) DEVICE — SU VICRYL 3-0 SH 8X18" UND J864D

## (undated) DEVICE — NIM PROBE PRASS INCREMENTING TIP 8225825

## (undated) DEVICE — COVER CAMERA IN-LIGHT DISP LT-C02

## (undated) DEVICE — ESU CORD BIPOLAR AND IRR TUBING AESCULAP US355

## (undated) DEVICE — ESU PENCIL SMOKE EVAC W/ROCKER SWITCH 0703-047-000

## (undated) DEVICE — PACK NEURO MINOR UMMC SNE32MNMU4

## (undated) DEVICE — VESSEL LOOPS RED MINI 31145710

## (undated) DEVICE — DRAIN JACKSON PRATT RESERVOIR 100ML SU130-1305

## (undated) DEVICE — SU ETHILON 3-0 PS-1 18" 1663H

## (undated) DEVICE — TUBING SUCTION 10'X3/16" N510

## (undated) DEVICE — ESU ELEC BLADE 2.75" COATED/INSULATED E1455

## (undated) DEVICE — CLIP HORIZON SM RED WIDE SLOT 001201

## (undated) DEVICE — SU SILK 0 TIE 6X30" A306H

## (undated) DEVICE — SU SILK 4-0 TIE 12X30" A303H

## (undated) DEVICE — BLADE CLIPPER SGL USE 9680

## (undated) DEVICE — PREP POVIDONE IODINE SOLUTION 10% 120ML

## (undated) DEVICE — NIM ELEC SUBDERMAL NDL 3PAIR/BOX

## (undated) DEVICE — DRSG TELFA 3X8" 1238

## (undated) DEVICE — SU PROLENE 5-0 RB-1DA 36"  8556H

## (undated) DEVICE — SU SILK 2-0 SH CR 5X18" C0125

## (undated) DEVICE — STRAP UNIVERSAL POSITIONING 2-PIECE 4X47X76" 91-287

## (undated) DEVICE — CLIP HORIZON MED BLUE 002200

## (undated) DEVICE — STRAP ARMBOARD IV POSITIONING 1.5X32" VELCRO 31142980

## (undated) DEVICE — SU SILK 3-0 TIE 12X30" A304H

## (undated) DEVICE — LABEL MEDICATION SYSTEM 3303-P

## (undated) DEVICE — SPONGE LAP 18X18" X8435

## (undated) DEVICE — RETR ELASTIC STAYS LONE STAR BLUNT DUAL LEAD 3550-1G

## (undated) DEVICE — SUCTION MANIFOLD DORNOCH ULTRA CART UL-CL500

## (undated) DEVICE — SU PROLENE 3-0 FS-2 18" 8665G

## (undated) DEVICE — BLADE KNIFE SURG 12 371112

## (undated) DEVICE — LINEN TOWEL PACK X30 5481

## (undated) DEVICE — CLIP GEM MICRO GEM2431

## (undated) DEVICE — PREP SKIN SCRUB TRAY 4461A

## (undated) DEVICE — BLADE KNIFE SURG 15 371115

## (undated) DEVICE — DRAPE POUCH INSTRUMENT 1018

## (undated) DEVICE — SUCTION SLEEVE NEPTUNE 2 165MM 0703-005-165

## (undated) DEVICE — DRSG TEGADERM 4X4 3/4" 1626W

## (undated) DEVICE — SOL NACL 0.9% IRRIG 1000ML BOTTLE 2F7124

## (undated) DEVICE — SOL WATER IRRIG 1000ML BOTTLE 2F7114

## (undated) DEVICE — VESSEL LOOPS YELLOW MAXI 31145694

## (undated) DEVICE — SOL NACL 0.9% INJ 1000ML BAG 2B1324X

## (undated) DEVICE — Device

## (undated) DEVICE — ESU GROUND PAD ADULT W/CORD E7507

## (undated) DEVICE — SPONGE RAY-TEC 4X8" 7318

## (undated) DEVICE — SU SILK 2-0 TIE 12X30" A305H

## (undated) RX ORDER — HYDROMORPHONE HYDROCHLORIDE 1 MG/ML
INJECTION, SOLUTION INTRAMUSCULAR; INTRAVENOUS; SUBCUTANEOUS
Status: DISPENSED
Start: 2017-03-16

## (undated) RX ORDER — FENTANYL CITRATE 50 UG/ML
INJECTION, SOLUTION INTRAMUSCULAR; INTRAVENOUS
Status: DISPENSED
Start: 2017-03-16

## (undated) RX ORDER — DEXTROSE MONOHYDRATE, SODIUM CHLORIDE, AND POTASSIUM CHLORIDE 50; 1.49; 4.5 G/1000ML; G/1000ML; G/1000ML
INJECTION, SOLUTION INTRAVENOUS
Status: DISPENSED
Start: 2017-03-16

## (undated) RX ORDER — CEFAZOLIN SODIUM 2 G/100ML
INJECTION, SOLUTION INTRAVENOUS
Status: DISPENSED
Start: 2017-03-16